# Patient Record
Sex: FEMALE | Race: BLACK OR AFRICAN AMERICAN | NOT HISPANIC OR LATINO
[De-identification: names, ages, dates, MRNs, and addresses within clinical notes are randomized per-mention and may not be internally consistent; named-entity substitution may affect disease eponyms.]

---

## 2017-09-11 ENCOUNTER — RESULT REVIEW (OUTPATIENT)
Age: 52
End: 2017-09-11

## 2017-11-09 ENCOUNTER — TRANSCRIPTION ENCOUNTER (OUTPATIENT)
Age: 52
End: 2017-11-09

## 2018-01-09 ENCOUNTER — APPOINTMENT (OUTPATIENT)
Dept: UROLOGY | Facility: CLINIC | Age: 53
End: 2018-01-09

## 2018-02-20 ENCOUNTER — LABORATORY RESULT (OUTPATIENT)
Age: 53
End: 2018-02-20

## 2018-02-20 ENCOUNTER — APPOINTMENT (OUTPATIENT)
Dept: UROLOGY | Facility: CLINIC | Age: 53
End: 2018-02-20
Payer: COMMERCIAL

## 2018-02-20 VITALS
SYSTOLIC BLOOD PRESSURE: 143 MMHG | OXYGEN SATURATION: 96 % | DIASTOLIC BLOOD PRESSURE: 91 MMHG | TEMPERATURE: 98.8 F | HEIGHT: 60 IN | WEIGHT: 120 LBS | BODY MASS INDEX: 23.56 KG/M2 | HEART RATE: 88 BPM

## 2018-02-20 DIAGNOSIS — F17.200 NICOTINE DEPENDENCE, UNSPECIFIED, UNCOMPLICATED: ICD-10-CM

## 2018-02-20 DIAGNOSIS — R31.29 OTHER MICROSCOPIC HEMATURIA: ICD-10-CM

## 2018-02-20 PROCEDURE — 99204 OFFICE O/P NEW MOD 45 MIN: CPT | Mod: 25

## 2018-02-20 PROCEDURE — 51798 US URINE CAPACITY MEASURE: CPT

## 2018-02-20 RX ORDER — SUMATRIPTAN 50 MG/1
50 TABLET, FILM COATED ORAL
Qty: 9 | Refills: 0 | Status: ACTIVE | COMMUNITY
Start: 2017-06-19

## 2018-02-20 RX ORDER — VALACYCLOVIR 500 MG/1
500 TABLET, FILM COATED ORAL
Qty: 30 | Refills: 0 | Status: ACTIVE | COMMUNITY
Start: 2017-08-29

## 2018-02-26 ENCOUNTER — RX RENEWAL (OUTPATIENT)
Age: 53
End: 2018-02-26

## 2018-02-26 LAB
APPEARANCE: CLEAR
BACTERIA UR CULT: NORMAL
BACTERIA: NEGATIVE
BILIRUBIN URINE: NEGATIVE
BLOOD URINE: NEGATIVE
COLOR: YELLOW
GLUCOSE QUALITATIVE U: NEGATIVE MG/DL
KETONES URINE: NEGATIVE
LEUKOCYTE ESTERASE URINE: NEGATIVE
MICROSCOPIC-UA: NORMAL
NITRITE URINE: NEGATIVE
PH URINE: 7.5
PROTEIN URINE: NEGATIVE MG/DL
RED BLOOD CELLS URINE: 2 /HPF
SPECIFIC GRAVITY URINE: 1.01
SQUAMOUS EPITHELIAL CELLS: 0 /HPF
UROBILINOGEN URINE: NEGATIVE MG/DL
WHITE BLOOD CELLS URINE: 0 /HPF

## 2018-04-26 ENCOUNTER — APPOINTMENT (OUTPATIENT)
Dept: UROLOGY | Facility: CLINIC | Age: 53
End: 2018-04-26
Payer: COMMERCIAL

## 2018-04-26 VITALS — TEMPERATURE: 99.1 F | HEART RATE: 76 BPM | SYSTOLIC BLOOD PRESSURE: 139 MMHG | DIASTOLIC BLOOD PRESSURE: 83 MMHG

## 2018-04-26 DIAGNOSIS — N32.81 OVERACTIVE BLADDER: ICD-10-CM

## 2018-04-26 PROCEDURE — 99213 OFFICE O/P EST LOW 20 MIN: CPT

## 2018-04-26 PROCEDURE — 51798 US URINE CAPACITY MEASURE: CPT

## 2019-03-25 ENCOUNTER — RX RENEWAL (OUTPATIENT)
Age: 54
End: 2019-03-25

## 2019-03-27 ENCOUNTER — TRANSCRIPTION ENCOUNTER (OUTPATIENT)
Age: 54
End: 2019-03-27

## 2019-04-30 ENCOUNTER — APPOINTMENT (OUTPATIENT)
Dept: UROLOGY | Facility: CLINIC | Age: 54
End: 2019-04-30
Payer: COMMERCIAL

## 2019-04-30 VITALS — TEMPERATURE: 98.9 F | HEART RATE: 64 BPM | SYSTOLIC BLOOD PRESSURE: 149 MMHG | DIASTOLIC BLOOD PRESSURE: 78 MMHG

## 2019-04-30 PROCEDURE — 99213 OFFICE O/P EST LOW 20 MIN: CPT

## 2019-04-30 NOTE — ASSESSMENT
[FreeTextEntry1] : OAB with occasional UUI\par continue oxbytynin\par no interest in botox/interstim\par f/u 1 year

## 2019-04-30 NOTE — HISTORY OF PRESENT ILLNESS
[FreeTextEntry1] : doing well on ditropan\par when off medication symptoms recur\par no nausea vomting. no hematuria\par

## 2020-06-23 ENCOUNTER — APPOINTMENT (OUTPATIENT)
Dept: UROLOGY | Facility: CLINIC | Age: 55
End: 2020-06-23

## 2020-08-20 ENCOUNTER — INPATIENT (INPATIENT)
Facility: HOSPITAL | Age: 55
LOS: 1 days | Discharge: HOME | DRG: 181 | End: 2020-08-22
Attending: HOSPITALIST | Admitting: STUDENT IN AN ORGANIZED HEALTH CARE EDUCATION/TRAINING PROGRAM
Payer: COMMERCIAL

## 2020-08-20 VITALS
SYSTOLIC BLOOD PRESSURE: 132 MMHG | DIASTOLIC BLOOD PRESSURE: 87 MMHG | OXYGEN SATURATION: 98 % | WEIGHT: 95.9 LBS | RESPIRATION RATE: 19 BRPM | HEART RATE: 121 BPM | TEMPERATURE: 99 F

## 2020-08-20 DIAGNOSIS — R63.8 OTHER SYMPTOMS AND SIGNS CONCERNING FOOD AND FLUID INTAKE: ICD-10-CM

## 2020-08-20 DIAGNOSIS — M54.6 PAIN IN THORACIC SPINE: ICD-10-CM

## 2020-08-20 DIAGNOSIS — G43.909 MIGRAINE, UNSPECIFIED, NOT INTRACTABLE, WITHOUT STATUS MIGRAINOSUS: ICD-10-CM

## 2020-08-20 DIAGNOSIS — C80.1 MALIGNANT (PRIMARY) NEOPLASM, UNSPECIFIED: ICD-10-CM

## 2020-08-20 DIAGNOSIS — G89.3 NEOPLASM RELATED PAIN (ACUTE) (CHRONIC): ICD-10-CM

## 2020-08-20 DIAGNOSIS — Z90.89 ACQUIRED ABSENCE OF OTHER ORGANS: Chronic | ICD-10-CM

## 2020-08-20 DIAGNOSIS — C34.92 MALIGNANT NEOPLASM OF UNSPECIFIED PART OF LEFT BRONCHUS OR LUNG: ICD-10-CM

## 2020-08-20 DIAGNOSIS — Z87.891 PERSONAL HISTORY OF NICOTINE DEPENDENCE: ICD-10-CM

## 2020-08-20 DIAGNOSIS — B00.9 HERPESVIRAL INFECTION, UNSPECIFIED: ICD-10-CM

## 2020-08-20 LAB
BLD GP AB SCN SERPL QL: NEGATIVE — SIGNIFICANT CHANGE UP
RH IG SCN BLD-IMP: POSITIVE — SIGNIFICANT CHANGE UP
SARS-COV-2 RNA SPEC QL NAA+PROBE: SIGNIFICANT CHANGE UP

## 2020-08-20 PROCEDURE — 71275 CT ANGIOGRAPHY CHEST: CPT | Mod: 26

## 2020-08-20 PROCEDURE — 93010 ELECTROCARDIOGRAM REPORT: CPT

## 2020-08-20 PROCEDURE — 99223 1ST HOSP IP/OBS HIGH 75: CPT

## 2020-08-20 PROCEDURE — 99285 EMERGENCY DEPT VISIT HI MDM: CPT

## 2020-08-20 PROCEDURE — 74177 CT ABD & PELVIS W/CONTRAST: CPT | Mod: 26

## 2020-08-20 RX ORDER — SODIUM CHLORIDE 9 MG/ML
1000 INJECTION INTRAMUSCULAR; INTRAVENOUS; SUBCUTANEOUS ONCE
Refills: 0 | Status: COMPLETED | OUTPATIENT
Start: 2020-08-20 | End: 2020-08-20

## 2020-08-20 RX ORDER — TIZANIDINE 4 MG/1
2 TABLET ORAL
Qty: 0 | Refills: 0 | DISCHARGE

## 2020-08-20 RX ORDER — SUMATRIPTAN SUCCINATE 4 MG/.5ML
50 INJECTION, SOLUTION SUBCUTANEOUS
Qty: 0 | Refills: 0 | DISCHARGE

## 2020-08-20 RX ORDER — IOHEXOL 300 MG/ML
30 INJECTION, SOLUTION INTRAVENOUS ONCE
Refills: 0 | Status: COMPLETED | OUTPATIENT
Start: 2020-08-20 | End: 2020-08-20

## 2020-08-20 RX ORDER — VALACYCLOVIR 500 MG/1
500 TABLET, FILM COATED ORAL DAILY
Refills: 0 | Status: DISCONTINUED | OUTPATIENT
Start: 2020-08-20 | End: 2020-08-22

## 2020-08-20 RX ORDER — VALACYCLOVIR 500 MG/1
0 TABLET, FILM COATED ORAL
Qty: 0 | Refills: 0 | DISCHARGE

## 2020-08-20 RX ORDER — VALACYCLOVIR 500 MG/1
500 TABLET, FILM COATED ORAL
Qty: 0 | Refills: 0 | DISCHARGE

## 2020-08-20 RX ORDER — SUMATRIPTAN SUCCINATE 4 MG/.5ML
0 INJECTION, SOLUTION SUBCUTANEOUS
Qty: 0 | Refills: 0 | DISCHARGE

## 2020-08-20 RX ORDER — SUMATRIPTAN SUCCINATE 4 MG/.5ML
50 INJECTION, SOLUTION SUBCUTANEOUS DAILY
Refills: 0 | Status: DISCONTINUED | OUTPATIENT
Start: 2020-08-20 | End: 2020-08-22

## 2020-08-20 RX ORDER — DIVALPROEX SODIUM 500 MG/1
1 TABLET, DELAYED RELEASE ORAL
Qty: 0 | Refills: 0 | DISCHARGE

## 2020-08-20 RX ORDER — ENOXAPARIN SODIUM 100 MG/ML
40 INJECTION SUBCUTANEOUS EVERY 24 HOURS
Refills: 0 | Status: DISCONTINUED | OUTPATIENT
Start: 2020-08-21 | End: 2020-08-22

## 2020-08-20 RX ORDER — DICLOFENAC SODIUM 75 MG/1
1 TABLET, DELAYED RELEASE ORAL
Qty: 0 | Refills: 0 | DISCHARGE

## 2020-08-20 RX ORDER — LIDOCAINE 4 G/100G
2 CREAM TOPICAL EVERY 24 HOURS
Refills: 0 | Status: DISCONTINUED | OUTPATIENT
Start: 2020-08-20 | End: 2020-08-22

## 2020-08-20 RX ORDER — ACETAMINOPHEN 500 MG
650 TABLET ORAL EVERY 6 HOURS
Refills: 0 | Status: DISCONTINUED | OUTPATIENT
Start: 2020-08-20 | End: 2020-08-22

## 2020-08-20 RX ORDER — DIVALPROEX SODIUM 500 MG/1
0 TABLET, DELAYED RELEASE ORAL
Qty: 0 | Refills: 0 | DISCHARGE

## 2020-08-20 RX ORDER — DIVALPROEX SODIUM 500 MG/1
500 TABLET, DELAYED RELEASE ORAL DAILY
Refills: 0 | Status: DISCONTINUED | OUTPATIENT
Start: 2020-08-20 | End: 2020-08-22

## 2020-08-20 RX ORDER — ONDANSETRON 8 MG/1
4 TABLET, FILM COATED ORAL ONCE
Refills: 0 | Status: COMPLETED | OUTPATIENT
Start: 2020-08-20 | End: 2020-08-21

## 2020-08-20 RX ADMIN — SODIUM CHLORIDE 1000 MILLILITER(S): 9 INJECTION INTRAMUSCULAR; INTRAVENOUS; SUBCUTANEOUS at 12:35

## 2020-08-20 RX ADMIN — SODIUM CHLORIDE 1000 MILLILITER(S): 9 INJECTION INTRAMUSCULAR; INTRAVENOUS; SUBCUTANEOUS at 13:36

## 2020-08-20 RX ADMIN — IOHEXOL 30 MILLILITER(S): 300 INJECTION, SOLUTION INTRAVENOUS at 12:35

## 2020-08-20 RX ADMIN — LIDOCAINE 2 PATCH: 4 CREAM TOPICAL at 19:04

## 2020-08-20 RX ADMIN — VALACYCLOVIR 500 MILLIGRAM(S): 500 TABLET, FILM COATED ORAL at 23:21

## 2020-08-20 RX ADMIN — DIVALPROEX SODIUM 500 MILLIGRAM(S): 500 TABLET, DELAYED RELEASE ORAL at 23:21

## 2020-08-20 NOTE — ED PROVIDER NOTE - CLINICAL SUMMARY MEDICAL DECISION MAKING FREE TEXT BOX
Pt sent by Dr Ruffin for poss pe seen on ct of neck done for supraclavicular lad; pt w concern for undiagnosed ca.  + sob x 1 wk, R upper back pain x ~ 1 mo (now resolved).  VS w tachycardia, sinus tach on ekg.  Plan labs, cta for pe but also ct abd/pelvis to eval for ? intra-abd mass or other ca related issue.

## 2020-08-20 NOTE — H&P ADULT - ATTENDING COMMENTS
54F recently quit smoker, w migraines, HSV infection p/w supraclavicular NEDRA accompanied by unintended wt loss and back pain, sent from outpatient for evaluation of possible PE - found to have multiple lung, liver, and chiquis lesions supicious for metastatic cancer of lung origin and normocytic anemia, admitted for further evaluation    Pt states she began feeling two nodules on Left side supraclavicularly and was sent by PMD to Dr. Ruffin. CT Cervical spine was obtained. sxs also accompanied by unintentional wt loss over last month, lower back pain, and dyspnea.  Sent to ED to evaluate for PE - CT negative for PE but numerous nodules found in lungs; - largest of which in YURY, liver, pleura and spine suspicious for metastatic disease. Pt currently endorses back pain in band like region above pelvis/sacral area. Denies any change in urge to defecate or urinate. Denies any paresthesias, weakness, issues w ambulation. No saddle anesthesia.  Exam: AA female in NAD on RA, RRR, no murmurs, no BLE edema or tenderness. CTAB, NABS, non-tender abdomen, Alert, oriented, CN II-XI grossly intact, sensation equal b/l, 5/5 strength throughout, nml heel/shin. Firm, non-mobile lymph nodes x2 palpated in L supraclavicular region.     #Metastatic disease - unknown primary. Likely lung of SMLC > NSCLC. CTA/PE Chest negative for PE  #Back pain 2/2 multiple bone metastases - pt states excedrin and salonpas has helped. Start NSAIDs w Lidocaine patch  #Normocytic anemia - denies any GI bleeding sxs. Possibly anemia of chronic disease in setting of malignancy  #Migraines - chronic. Controlled. c/w home agents - depakote, sumatriptan PRN  #HSV infection - chronic. c/w home valtrex  PPx: SQL    Plan  NPO; IR cx for biopsy  Iron panel; Ferritin  F/U Heme-Onc recommendations

## 2020-08-20 NOTE — ED ADULT NURSE NOTE - ED STAT RN HANDOFF DETAILS
Patient stable for transfer to ed holding. Transfer and plan of care discussed with patient and verbalized understanding. No acute distress noted. Report given to YI Morales. Safety precautions maintained. Belongings secured with patient.

## 2020-08-20 NOTE — H&P ADULT - HISTORY OF PRESENT ILLNESS
Patient is a 53 y/o F with Patient is a 53 y/o F with migraines, scaitica, and significant smoking history (about 5 cigarettes day for 38 years) who presents to ED after Dr. Ruffin saw possible PE and metastasis on a CT scan of neck. Patient states she has felt a nodule in her neck since last July. When she went to her PCP (Dr. Joel Townsend) her PCP was concerned for possible malignancy after Patient is a 55 y/o F with migraines, scaitica, and significant smoking history (about 5 cigarettes day for 38 years) who presents to ED after Dr. Ruffin saw possible PE and metastasis on a CT scan of neck. Patient states she has felt a nodule in her neck since last July. When she went to her PCP (Dr. Joel Townsend) her PCP was concerned for possible malignancy after examination. She sent Mrs. Zapien to see Dr. Ruffin, Oncologist, last week where he ordered a CT scan of the neck. The results of CT came back for possible PE and concern for metastasis, patient informed of results this morning and instructed to come to the ED. She endorses significant weight loss, 11 lbs in the past month along with mild shortness of breath. She states she noticed herself "catching her breath" more recently. She believed it to be due to wearing a mask. She also reports having severe back pain since July for which she took muscle relaxants for without relief. She reports improvement in back pain currently. She denies chest pain, abdominal pain, and N/V/D.     ED Vitals: T 98.9, , /87, RR 19, 98 O2 RA   ED Labs/ imaging: Hb 9.7, K 3.4, Alk phos 509   CT angio: neg PE, CT chest/abd/pelvis: Left lung mass, left supraclavicular and right hilar lymphadenopathy, diffuse osseous metastasis, suspected hepatic and pleural metastasis     PCP: Dr. Rosette Townsend, [phone]     Patient admitted for Patient is a 53 y/o F with migraines, scaitica, and significant smoking history (about 5 cigarettes day for 38 years) who presents to ED after Dr. Ruffin saw possible PE and metastasis on a CT scan of neck. Patient states she has felt a nodule in her neck since last July. When she went to her PCP (Dr. Joel Townsend) her PCP was concerned for possible malignancy after examination. She sent Mrs. Zapien to see Dr. Ruffin, Oncologist, last week where he ordered a CT scan of the neck. The results of CT came back for possible PE and concern for metastasis, patient informed of results this morning and instructed to come to the ED. She endorses significant weight loss, 11 lbs in the past month along with mild shortness of breath. She states she noticed herself "catching her breath" more recently. She believed it to be due to wearing a mask. She also reports having severe back pain since July for which she took muscle relaxants for without relief. She reports improvement in back pain currently. She denies chest pain, abdominal pain, and N/V/D.     ED Vitals: T 98.9, , /87, RR 19, 98 O2 RA   ED Labs/ imaging: Hb 9.7, K 3.4, Alk phos 509   CT angio: neg PE, CT chest/abd/pelvis: Left lung mass, left supraclavicular and right hilar lymphadenopathy, diffuse osseous metastasis, suspected hepatic and pleural metastasis     PCP: Dr. Rosette Townsend, [phone]     Patient admitted for newly diagnosed stage 4 lung malignancy with metatstasis seen on CT Patient is a 55 y/o F with migraines, sciatica and significant smoking history (about 5 cigarettes day for 38 years) who presents to ED after CT scan showed concern for PE and new metastatic lesions. Patient states she has felt a nodule in her neck since last July. When she went to her PCP (Dr. Joel Townsend) her PCP was concerned for possible malignancy after examination. She sent Mrs. Zapien to see Dr. Ruffin, Oncologist, last week where he ordered a CT scan of the neck. The results of CT came back for possible PE and concern for metastasis, patient informed of results this morning and instructed to come to the ED. She endorses significant weight loss, 11 lbs in the past month along with mild shortness of breath. She states she noticed herself "catching her breath" more recently. She believed it to be due to wearing a mask. She also reports having severe back pain since July for which she took muscle relaxants for without relief. She reports improvement in back pain currently. She denies chest pain, abdominal pain, and N/V/D.     ED Vitals: T 98.9, , /87, RR 19, 98 O2 RA   ED Labs/ imaging: Hb 9.7, K 3.4, Alk phos 509   CT angio: neg PE, CT chest/abd/pelvis: Left lung mass, left supraclavicular and right hilar lymphadenopathy, diffuse osseous metastasis, suspected hepatic and pleural metastasis     PCP: Dr. Rosette Townsend, [phone]     Patient admitted for newly diagnosed stage 4 lung malignancy with metastasis seen on CT.

## 2020-08-20 NOTE — H&P ADULT - PROBLEM SELECTOR PLAN 1
pt presenting with weight loss, smoking history, and back pain in the setting of newly found lung mass on CT. Likely small cell carcinoma given smoking hx and extent of metastasis   - CT c/a/p: Left lung mass consistent with malignancy, with left supraclavicular and right hilar mets, suspected hepatic and pleural mets, and diffuse osseus mets  PLAN  - Heme/onc consulted  - IR guided biopsy in AM   - f/u PET scan   - f/u MRI head pt presenting with weight loss, smoking history, and back pain in the setting of newly found lung mass on CT. Likely primary lung carcinoma,  small cell lung carcinoma given smoking hx and extent of metastasis   - CT c/a/p: Left lung mass consistent with malignancy, with left supraclavicular and right hilar mets, suspected hepatic and pleural mets, and diffuse osseus mets  PLAN  - Heme/onc consulted  - IR guided biopsy in AM   - f/u PET scan   - f/u MRI head, MRI cervical spine, MRI thoracic spine

## 2020-08-20 NOTE — H&P ADULT - NSHPSOCIALHISTORY_GEN_ALL_CORE
Current smoker, 5 cigarettes daily for 38 years   Alcohol use: socially   Denies recreational drug use

## 2020-08-20 NOTE — CONSULT NOTE ADULT - ASSESSMENT
55 yo F smoker was recently evaluated at Dr. Ruffin's office for painless cervical adenopathy, unintentional weight loss. Initial CT of the neck with evidence of metastatic disease and possible lung primary and PE. Sent to the ED for CTA evaluation and CT abdomen. CTA negative for PE.   Results form her scan in the ED:   1.  Left lung mass consistent with malignancy suspicious for primary which broadly contacts left major fissure and also pericardium.  2.  Left supraclavicular and right hilar adenopathy consistent with metastasis.  3.  Diffuse osseous metastases throughout the axial skeleton, sternum, and pelvis with multilevel compression deformities suspected pathologic bases as detailed above.  4.  Suspected hepatic and right pleural metastases.  5.  Punctate noncalcified subpleural nodule medial right middle lobe 0.2 cm, indeterminate, possibly metastasis    #Metastatic Cancer  - Likely lung primary with metastatic disease to bone, liver.   - ECOG PS 0-1  - Will need tissue biopsy  - Recommend obtaining PET scan  - Recommend IR guided biopsy of hepatic lesion  - Recommend MRIs of the brain, cervical, thoracic spine with and without IV contrast  - Recommend Pulmonary consult    D/w Dr. Ruffin

## 2020-08-20 NOTE — H&P ADULT - PROBLEM SELECTOR PLAN 5
F: none   E: replete as needed  N: NPO after midnight   DVT ppx: Lovenox (khorana score for VTE= 3, high risk)   CODE: FULL  Dispo: RM

## 2020-08-20 NOTE — H&P ADULT - PROBLEM SELECTOR PLAN 4
F: none   E: replete as needed  N: NPO after midnight   DVT ppx: Lovenox (khorana score for VTE= 3, high risk)   CODE: FULL  Dispo: RM pt with back pain, worsening recently. likely 2/2 osseus mets  - lidocaine patch, Tylenol prn  - f/u MRI thoracic spine

## 2020-08-20 NOTE — H&P ADULT - PROBLEM SELECTOR PROBLEM 1
Health Maintenance Due   Topic Date Due   • Colorectal Cancer Screening-Colonoscopy  11/12/2013   • DTaP/Tdap/Td Vaccine (2 - Td) 02/09/2019       Patient is due for topics listed above, she wishes to proceed with Immunization(s) Dtap/Tdap/Td, but is not proceeding with Colorectal Cancer Screening: Colonoscopy at this time.              Primary malignant neoplasm of left lung metastatic to other site Malignancy

## 2020-08-20 NOTE — ED ADULT NURSE NOTE - CHPI ED NUR SYMPTOMS NEG
no tingling/no fever/no pain/no vomiting/no weakness/no nausea/no chills/no dizziness/no decreased eating/drinking

## 2020-08-20 NOTE — H&P ADULT - NSHPPHYSICALEXAM_GEN_ALL_CORE
.  VITAL SIGNS:  T(C): 37.2 (08-20-20 @ 11:28), Max: 37.2 (08-20-20 @ 11:28)  T(F): 98.9 (08-20-20 @ 11:28), Max: 98.9 (08-20-20 @ 11:28)  HR: 101 (08-20-20 @ 14:52) (101 - 121)  BP: 142/84 (08-20-20 @ 14:52) (132/87 - 142/84)  BP(mean): --  RR: 20 (08-20-20 @ 14:52) (19 - 20)  SpO2: 98% (08-20-20 @ 14:52) (98% - 98%)  Wt(kg): --    PHYSICAL EXAM:    Constitutional: WDWN resting comfortably in bed; NAD  Head: NC/AT  Eyes: PERRL, EOMI, clear conjunctiva  ENT: no nasal discharge; uvula midline, no oropharyngeal erythema or exudates; MMM  Neck: supple; no JVD   Respiratory: CTA B/L; no W/R/R, no retractions  Cardiac: +S1/S2; RRR; no M/R/G; PMI non-displaced  Gastrointestinal: soft, NT/ND; no rebound or guarding; +BSx4  Back: spine midline, no bony tenderness  Extremities: WWP, no clubbing or cyanosis; no peripheral edema  Musculoskeletal: NROM x4; no joint swelling, tenderness or erythema  Vascular: 2+ radial, DP/PT pulses B/L  Dermatologic: skin warm, dry and intact; no rashes, wounds, or scars  Lymphatic: supraclavicular lymphadenopathy   Neurologic: AAOx3; CNII-XII grossly intact; no focal deficits  Psychiatric: affect and characteristics of appearance, verbalizations, behaviors are appropriate

## 2020-08-20 NOTE — ED ADULT TRIAGE NOTE - CHIEF COMPLAINT QUOTE
Patient went to see her PMD c/o a lump to the left side of her neck and shortness of breath, and was told that she has a clot in her lungs that showed up on the CT chest.

## 2020-08-20 NOTE — ED PROVIDER NOTE - PROGRESS NOTE DETAILS
Pt discussed w Dr Ruffin - pt eval for L cervical lad, h/o tob - ct w mets to cervical and thoracic spine, ? pe L lung.  He's requesting ct angio for pe but agrees w addition of ct abd/pelvis to eval for likely lung ca w mets.  Heme fellow coming to see pt in ED.  He states hgb 9.9 3 d ago, iron studies nl, suspects anemia of chronic disease, wbc/plt nl. Verbal report - pt w/o pe but + stage 4 lung ca w mets to liver, bone, carcinomatosis.  Heme fellow w pt.  Results discussed w Dr Ruffin - wants to admit pt for biopsy and treatment initiation due to strong concern for aggressive small cell lung ca.

## 2020-08-20 NOTE — ED PROVIDER NOTE - OBJECTIVE STATEMENT
53 yo female h/o migraines, hsv sent by Dr Ruffin for further eval.  Pt notes ~ 1 mo wt loss, night sweats, pain in R lateral mid back and midline low back (now resolved) and swelling L neck area.  Pt discussed w her pmd who did blood work and sent her for eval with Dr Ruffin.  He saw the pt 8/17 and sent her for ct neck which showed bony mets and ?able pe L lung so the pt was referred to the ed for eval.  Pt notes mild sob, worse w exertion x 1 wk.  No cough, uri sx, fever, cp, abd pain, n/v/d.  Pt reports nl mammogram last yr, nl colonoscopy 5 yr ago.  No black/bloody stools.  No other lad.  + fh lung ca (father), leukemia (brother).  + tob - quit ~ 1 wk ago.  Pt notes ~ 10 lb wt loss over the past month.  No travel, le edema/pain, numbness/weakness in ext, trauma to back, change in upper back pain w deep inspiration, sick contacts.  No h/o malignancy.

## 2020-08-20 NOTE — H&P ADULT - NSICDXFAMILYHX_GEN_ALL_CORE_FT
FAMILY HISTORY:  Family history of leukemia, Brother - in remission  Family hx of lung cancer, Father  FHx: throat cancer, Father

## 2020-08-20 NOTE — ED ADULT NURSE NOTE - OBJECTIVE STATEMENT
Received ambulatory sent in from MD Ruffin's office for possible PE as per patient. AOX4, speaking full sentences.  Patient denies chest pain.  Chest rise equal and symmetrical bilaterally. Resps even and nonlabored. Patient complaints of shortness of breath on exertion. Moves all extremities. No obvious trauma/injury/deformity noted. Patient oriented to ED area. All needs attended. POC reviewed. EKG done. Hourly rounding in progress.

## 2020-08-20 NOTE — H&P ADULT - ASSESSMENT
Patient is a 55 y/o F with migraines, sciatica and significant smoking history (about 5 cigarettes day for 38 years) who presents to ED after CT scan showed concern for PE and new metastatic lesions, admitted for newly diagnosed stage 4 lung carcinoma with metastasis seen on CT. Patient is a 55 y/o F with migraines, sciatica and significant smoking history (about 5 cigarettes day for 38 years) who presents to ED after CT scan showed concern for PE and new metastatic lesions, admitted for new malignancy with metastasis seen on CT, likely primary lung carcinoma.

## 2020-08-20 NOTE — ED PROVIDER NOTE - MUSCULOSKELETAL, MLM
Spine appears normal, neck/back nontender, range of motion is not limited, no muscle or joint tenderness

## 2020-08-20 NOTE — CONSULT NOTE ADULT - SUBJECTIVE AND OBJECTIVE BOX
Hematology Consult Note    HPI:  53 yo F was recently seen at Dr. Ruffin's office referred for evaluation of cervical adenopathy. States around the second week of July felt a lump in the left side of her neck, above the clavicle, described as painless. In addition she has lost weight recently, unintentionally. (>10lbs) Patient is a daily smoker but Denies ETOH abuse. Denies fevers, no night sweats, no dysphagia, no odynophagia. She denies any n/v, cough, shortness of breath, abdominal pain. Has had chronic musculoskeletal pain, sees a pain specialist. No headaches/dizziness.    Given these findings the patient had a CT of the neck performed yesterday  CT results as follows:   Findings consistent with metastatic disease involving the left frontal calvarium with intracranial extension as well as involving the cervical and thoracic spines;  Opacity within the left midlung zone on  images with right posterior pleural thickening and question of filling defects involving right pulmonary artery branches which can be seen in the setting of pulmonary emboli; Enlarged left thyroid lobe which demonstrates mediastinal extension with a nodule involving the posterior aspect of the left thyroid lobe.  Ovoid low density structure within the anterior right para midline tongue in the region in the medial aspect of the sublingual space, abutting the genioglossus muscle;    After the scan was read, patient was called today and advised to go the ED for a CTA to rule out Pulmonary embolism. CTA performed in the ED and without PE, however likely lung primary identified and liver and bony mets noted.     Patient will now be admitted for further workup, to complete staging, biopsy and possibly treatment.       Allergies:  No Known Allergies      MEDICATIONS  (PRN):  ondansetron Injectable 4 milliGRAM(s) IV Push once PRN Nausea and/or Vomiting      PAST MEDICAL & SURGICAL HISTORY:  HSV infection  Migraine  No significant past surgical history    FAMILY HISTORY:  Father: Lung Ca  Brother and Uncle: Leukemia     SOCIAL HISTORY: No EtOH, Smoker +    REVIEW OF SYSTEMS:    CONSTITUTIONAL: No weakness, fevers or chills  EYES/ENT: No visual changes;  No vertigo or throat pain   NECK: No pain or stiffness  RESPIRATORY: No cough, wheezing, hemoptysis; No shortness of breath  CARDIOVASCULAR: No chest pain or palpitations  GASTROINTESTINAL: No abdominal or epigastric pain. No nausea, vomiting, or hematemesis; No diarrhea or constipation. No melena or hematochezia.  GENITOURINARY: No dysuria, frequency or hematuria  NEUROLOGICAL: No numbness or weakness  SKIN: No itching, burning, rashes, or lesions   All other review of systems is negative unless indicated above.      Weight (kg): 43.5 (08-20 @ 11:28)    T(F): 98.9 (08-20-20 @ 11:28), Max: 98.9 (08-20-20 @ 11:28)  HR: 101 (08-20-20 @ 14:52)  BP: 142/84 (08-20-20 @ 14:52)  RR: 20 (08-20-20 @ 14:52)  SpO2: 98% (08-20-20 @ 14:52)  Wt(kg): --    GENERAL: NAD, thin  HEAD:  Atraumatic, Normocephalic  EYES: EOMI,  sclera clear  NECK: Supple, No JVD,  L supraclavicular LN +  CHEST/LUNG: Clear to auscultation bilaterally; No wheeze  HEART: Regular rate and rhythm; No murmurs, rubs, or gallops  ABDOMEN: Soft, Nontender, Nondistended; Bowel sounds present  EXTREMITIES:  2+ Peripheral Pulses, No clubbing, cyanosis, or edema  NEUROLOGY: non-focal  SKIN: No rashes or lesions                          9.7    8.83  )-----------( 360      ( 20 Aug 2020 11:49 )             32.3       08-20    141  |  101  |  7   ----------------------------<  125<H>  3.4<L>   |  26  |  0.69    Ca    10.1      20 Aug 2020 11:49    TPro  8.0  /  Alb  4.2  /  TBili  0.2  /  DBili  x   /  AST  20  /  ALT  5<L>  /  AlkPhos  509<H>  08-20

## 2020-08-20 NOTE — H&P ADULT - NSHPLABSRESULTS_GEN_ALL_CORE
.  LABS:                         9.7    8.83  )-----------( 360      ( 20 Aug 2020 11:49 )             32.3     08-20    141  |  101  |  7   ----------------------------<  125<H>  3.4<L>   |  26  |  0.69    Ca    10.1      20 Aug 2020 11:49    TPro  8.0  /  Alb  4.2  /  TBili  0.2  /  DBili  x   /  AST  20  /  ALT  5<L>  /  AlkPhos  509<H>  08-20    PT/INR - ( 20 Aug 2020 11:49 )   PT: 13.5 sec;   INR: 1.13          PTT - ( 20 Aug 2020 11:49 )  PTT:32.0 sec    CARDIAC MARKERS ( 20 Aug 2020 11:49 )  x     / <0.01 ng/mL / x     / x     / x          Serum Pro-Brain Natriuretic Peptide: 47 pg/mL (08-20 @ 11:49)        RADIOLOGY, EKG & ADDITIONAL TESTS: Reviewed.     CT Angio Chest PE Protocol w/ IV Cont: Negative for PE     1.  Left lung mass consistent with malignancy suspicious for primary which broadly contacts left major fissure and also pericardium.  2.  Left supraclavicular and right hilar adenopathy consistent with metastasis.  3.  Diffuse osseous metastases throughout the axial skeleton, sternum, and pelvis with multilevel compression deformities suspected pathologic bases as detailed above.  4.  Suspected hepatic and right pleural metastases.  5.  Punctate noncalcified subpleural nodule medial right middle lobe 0.2 cm, indeterminate, possibly metastasis.

## 2020-08-20 NOTE — H&P ADULT - PROBLEM SELECTOR PLAN 2
history of migraines, takes Depakote for prophylaxis and sumatriptan when needed   - also takes excedrin if needed   - continue Depakote and Sumatriptan history of migraines, takes Depakote for prophylaxis and sumatriptan when needed   - also takes Excedrin if needed   - continue Depakote and Sumatriptan

## 2020-08-21 ENCOUNTER — TRANSCRIPTION ENCOUNTER (OUTPATIENT)
Age: 55
End: 2020-08-21

## 2020-08-21 ENCOUNTER — RESULT REVIEW (OUTPATIENT)
Age: 55
End: 2020-08-21

## 2020-08-21 LAB
ALBUMIN SERPL ELPH-MCNC: 3.8 G/DL — SIGNIFICANT CHANGE UP (ref 3.3–5)
ALP SERPL-CCNC: 436 U/L — HIGH (ref 40–120)
ALT FLD-CCNC: 14 U/L — SIGNIFICANT CHANGE UP (ref 10–45)
ANION GAP SERPL CALC-SCNC: 15 MMOL/L — SIGNIFICANT CHANGE UP (ref 5–17)
APTT BLD: 28.2 SEC — SIGNIFICANT CHANGE UP (ref 27.5–35.5)
AST SERPL-CCNC: 38 U/L — SIGNIFICANT CHANGE UP (ref 10–40)
BASOPHILS # BLD AUTO: 0.07 K/UL — SIGNIFICANT CHANGE UP (ref 0–0.2)
BASOPHILS NFR BLD AUTO: 0.7 % — SIGNIFICANT CHANGE UP (ref 0–2)
BILIRUB SERPL-MCNC: 0.3 MG/DL — SIGNIFICANT CHANGE UP (ref 0.2–1.2)
BUN SERPL-MCNC: 7 MG/DL — SIGNIFICANT CHANGE UP (ref 7–23)
CALCIUM SERPL-MCNC: 9.8 MG/DL — SIGNIFICANT CHANGE UP (ref 8.4–10.5)
CHLORIDE SERPL-SCNC: 100 MMOL/L — SIGNIFICANT CHANGE UP (ref 96–108)
CO2 SERPL-SCNC: 24 MMOL/L — SIGNIFICANT CHANGE UP (ref 22–31)
CREAT SERPL-MCNC: 0.6 MG/DL — SIGNIFICANT CHANGE UP (ref 0.5–1.3)
EOSINOPHIL # BLD AUTO: 0.1 K/UL — SIGNIFICANT CHANGE UP (ref 0–0.5)
EOSINOPHIL NFR BLD AUTO: 0.9 % — SIGNIFICANT CHANGE UP (ref 0–6)
GLUCOSE BLDC GLUCOMTR-MCNC: 109 MG/DL — HIGH (ref 70–99)
GLUCOSE SERPL-MCNC: 139 MG/DL — HIGH (ref 70–99)
HCT VFR BLD CALC: 27.3 % — LOW (ref 34.5–45)
HCV AB S/CO SERPL IA: 0.04 S/CO — SIGNIFICANT CHANGE UP
HCV AB SERPL-IMP: SIGNIFICANT CHANGE UP
HGB BLD-MCNC: 8.2 G/DL — LOW (ref 11.5–15.5)
IMM GRANULOCYTES NFR BLD AUTO: 1.7 % — HIGH (ref 0–1.5)
INR BLD: 1.22 — HIGH (ref 0.88–1.16)
LYMPHOCYTES # BLD AUTO: 19.3 % — SIGNIFICANT CHANGE UP (ref 13–44)
LYMPHOCYTES # BLD AUTO: 2.05 K/UL — SIGNIFICANT CHANGE UP (ref 1–3.3)
MAGNESIUM SERPL-MCNC: 2.3 MG/DL — SIGNIFICANT CHANGE UP (ref 1.6–2.6)
MCHC RBC-ENTMCNC: 27.1 PG — SIGNIFICANT CHANGE UP (ref 27–34)
MCHC RBC-ENTMCNC: 30 GM/DL — LOW (ref 32–36)
MCV RBC AUTO: 90.1 FL — SIGNIFICANT CHANGE UP (ref 80–100)
MONOCYTES # BLD AUTO: 0.78 K/UL — SIGNIFICANT CHANGE UP (ref 0–0.9)
MONOCYTES NFR BLD AUTO: 7.4 % — SIGNIFICANT CHANGE UP (ref 2–14)
NEUTROPHILS # BLD AUTO: 7.43 K/UL — HIGH (ref 1.8–7.4)
NEUTROPHILS NFR BLD AUTO: 70 % — SIGNIFICANT CHANGE UP (ref 43–77)
NRBC # BLD: 0 /100 WBCS — SIGNIFICANT CHANGE UP (ref 0–0)
PHOSPHATE SERPL-MCNC: 3.8 MG/DL — SIGNIFICANT CHANGE UP (ref 2.5–4.5)
PLATELET # BLD AUTO: 305 K/UL — SIGNIFICANT CHANGE UP (ref 150–400)
POTASSIUM SERPL-MCNC: 3.8 MMOL/L — SIGNIFICANT CHANGE UP (ref 3.5–5.3)
POTASSIUM SERPL-SCNC: 3.8 MMOL/L — SIGNIFICANT CHANGE UP (ref 3.5–5.3)
PROT SERPL-MCNC: 7.1 G/DL — SIGNIFICANT CHANGE UP (ref 6–8.3)
PROTHROM AB SERPL-ACNC: 14.5 SEC — HIGH (ref 10.6–13.6)
RBC # BLD: 3.03 M/UL — LOW (ref 3.8–5.2)
RBC # FLD: 15.7 % — HIGH (ref 10.3–14.5)
SODIUM SERPL-SCNC: 139 MMOL/L — SIGNIFICANT CHANGE UP (ref 135–145)
WBC # BLD: 10.61 K/UL — HIGH (ref 3.8–10.5)
WBC # FLD AUTO: 10.61 K/UL — HIGH (ref 3.8–10.5)

## 2020-08-21 PROCEDURE — 88305 TISSUE EXAM BY PATHOLOGIST: CPT | Mod: 26

## 2020-08-21 PROCEDURE — 99233 SBSQ HOSP IP/OBS HIGH 50: CPT | Mod: GC

## 2020-08-21 PROCEDURE — 99152 MOD SED SAME PHYS/QHP 5/>YRS: CPT

## 2020-08-21 PROCEDURE — 99223 1ST HOSP IP/OBS HIGH 75: CPT | Mod: GC

## 2020-08-21 PROCEDURE — 76942 ECHO GUIDE FOR BIOPSY: CPT | Mod: 26

## 2020-08-21 PROCEDURE — 78815 PET IMAGE W/CT SKULL-THIGH: CPT | Mod: 26

## 2020-08-21 PROCEDURE — 47000 NEEDLE BIOPSY OF LIVER PERQ: CPT

## 2020-08-21 PROCEDURE — 88341 IMHCHEM/IMCYTCHM EA ADD ANTB: CPT | Mod: 26

## 2020-08-21 PROCEDURE — 88342 IMHCHEM/IMCYTCHM 1ST ANTB: CPT | Mod: 26

## 2020-08-21 PROCEDURE — 88173 CYTOPATH EVAL FNA REPORT: CPT | Mod: 26

## 2020-08-21 RX ORDER — METOCLOPRAMIDE HCL 10 MG
5 TABLET ORAL ONCE
Refills: 0 | Status: COMPLETED | OUTPATIENT
Start: 2020-08-21 | End: 2020-08-21

## 2020-08-21 RX ORDER — KETOROLAC TROMETHAMINE 30 MG/ML
15 SYRINGE (ML) INJECTION ONCE
Refills: 0 | Status: DISCONTINUED | OUTPATIENT
Start: 2020-08-21 | End: 2020-08-22

## 2020-08-21 RX ADMIN — DIVALPROEX SODIUM 500 MILLIGRAM(S): 500 TABLET, DELAYED RELEASE ORAL at 15:20

## 2020-08-21 RX ADMIN — LIDOCAINE 2 PATCH: 4 CREAM TOPICAL at 07:22

## 2020-08-21 RX ADMIN — LIDOCAINE 2 PATCH: 4 CREAM TOPICAL at 20:30

## 2020-08-21 RX ADMIN — ONDANSETRON 4 MILLIGRAM(S): 8 TABLET, FILM COATED ORAL at 15:20

## 2020-08-21 RX ADMIN — VALACYCLOVIR 500 MILLIGRAM(S): 500 TABLET, FILM COATED ORAL at 15:20

## 2020-08-21 RX ADMIN — LIDOCAINE 2 PATCH: 4 CREAM TOPICAL at 18:00

## 2020-08-21 RX ADMIN — Medication 5 MILLIGRAM(S): at 18:21

## 2020-08-21 NOTE — CONSULT NOTE ADULT - ATTENDING COMMENTS
Ms Zapien is a 53 yo F whom was referred to the emergency department after undergoing a CT of the Neck as an outpatient for cervical lymphadenopathy.  On CT imaging was a concern for a pulmonary vascular filling defect.  Patient was therefore recommended to go to the emergency room to rule out a PE    CT Neck showed significant metastasis and further imaging of CT Chest/Abdomen/Pelvis shows diffuse metastasis in the liver, osseous metastasis.  Patient with a lung mass.      Patient with back pain    Concern for primary lung cancer, given smoking history and diffuse metastasis possible small cell lung cancer    Recommend MRI of the Brain given CT neck showed calvarium lesion, rule out brain metastasis.      PET/CT scan    Recommend tissue biopsy, suspect liver biopsy maybe the most accessible    Would also recommend MRI imaging of the cervical/thoracic spine.  Has pain of the back but history of sciatica.  Denies any focal neurologic deficit, no bowel/urinary incontinence
Active smoker, family history of malignancy with left lung mass with mediastinal, supraclavicular lymphadenopathy with liver and bone metastasis. Agree with plan for liver biopsy for diagnosis for staging. No need for EBUS at present. If patient does not get enough tissue then supraclavicular lymph node resection should be considered for diagnosis or for tissue for molecular analysis for situation. Rest as above

## 2020-08-21 NOTE — CONSULT NOTE ADULT - SUBJECTIVE AND OBJECTIVE BOX
PULMONARY SERVICE INITIAL CONSULT NOTE  -------------------------------------------------------------    Chief Complaint/Reason for Consult:     HPI: 55 y/o F with migraines, sciatica and significant smoking history (about 5 cigarettes day for 38 years) who presents to ED after CT scan showed concern for PE and new metastatic lesions. Patient states she has felt a nodule in her neck since last July. When she went to her PCP (Dr. Joel Townsend) her PCP was concerned for possible malignancy after examination. She sent Mrs. Zapien to see Dr. Ruffin, Oncologist, last week where he ordered a CT scan of the neck. The results of CT came back for possible PE and concern for metastasis, patient informed of results this morning and instructed to come to the ED. She endorses significant weight loss, 11 lbs in the past month along with mild shortness of breath. She states she noticed herself "catching her breath" more recently. She believed it to be due to wearing a mask. She also reports having severe back pain since July for which she took muscle relaxants for without relief. She reports improvement in back pain currently.     Otherwise, pt denies fevers/chills, HA, dizziness, CP, cough, palpitations, abd pain, N/V, bowel changes, ext swelling     -------------------------------------------------------------  PAST MEDICAL & SURGICAL HISTORY:  HSV infection  Migraine  No pertinent past medical history  History of tonsillectomy  No significant past surgical history      FAMILY HISTORY:  Family history of leukemia: Brother - in remission  FHx: throat cancer: Father  Family hx of lung cancer: Father      SOCIAL HISTORY:  Smoking Status: [ ] Current, [ ] Former, [ ] Never  Pack Years:    MEDICATIONS:  Pulmonary:    Antimicrobials:  valACYclovir 500 milliGRAM(s) Oral daily    Anticoagulants:  enoxaparin Injectable 40 milliGRAM(s) SubCutaneous every 24 hours    Onc:    GI/:    Endocrine:    Cardiac:    Other Medications:  acetaminophen   Tablet .. 650 milliGRAM(s) Oral every 6 hours PRN  diVALproex  milliGRAM(s) Oral daily  lidocaine   Patch 2 Patch Transdermal every 24 hours  ondansetron Injectable 4 milliGRAM(s) IV Push once PRN  SUMAtriptan 50 milliGRAM(s) Oral daily PRN      Allergies    No Known Allergies    Intolerances        Vital Signs Last 24 Hrs  T(C): 37.6 (21 Aug 2020 06:02), Max: 38 (20 Aug 2020 19:43)  T(F): 99.7 (21 Aug 2020 06:02), Max: 100.4 (20 Aug 2020 19:43)  HR: 90 (21 Aug 2020 06:02) (89 - 121)  BP: 128/75 (21 Aug 2020 06:02) (128/75 - 144/78)  BP(mean): --  RR: 18 (21 Aug 2020 06:02) (17 - 20)  SpO2: 97% (21 Aug 2020 06:02) (97% - 99%)        -------------------------------------------------------------  PHYSICAL EXAM:    GEN: Comfortable, in NAD  HEENT: NC/AT, PEERLA, MMM  CARDIAC: RRR, Normal S1/S2, No MRGs  PULMONARY: CTA BL, no wheezing/rhonchi/rales - no accessory muscle use   ABDOMEN: Soft, NT/ND   EXT: No LE edema  NEURO: A&O x 3, CN II-XII grossly intact, moves all ext, sensation intact    -------------------------------------------------------------  LABS:        CBC Full  -  ( 20 Aug 2020 11:49 )  WBC Count : 8.83 K/uL  RBC Count : 3.52 M/uL  Hemoglobin : 9.7 g/dL  Hematocrit : 32.3 %  Platelet Count - Automated : 360 K/uL  Mean Cell Volume : 91.8 fl  Mean Cell Hemoglobin : 27.6 pg  Mean Cell Hemoglobin Concentration : 30.0 gm/dL  Auto Neutrophil # : 5.81 K/uL  Auto Lymphocyte # : 2.24 K/uL  Auto Monocyte # : 0.39 K/uL  Auto Eosinophil # : 0.15 K/uL  Auto Basophil # : 0.16 K/uL  Auto Neutrophil % : 64.9 %  Auto Lymphocyte % : 25.4 %  Auto Monocyte % : 4.4 %  Auto Eosinophil % : 1.7 %  Auto Basophil % : 1.8 %    08-20    141  |  101  |  7   ----------------------------<  125<H>  3.4<L>   |  26  |  0.69    Ca    10.1      20 Aug 2020 11:49    TPro  8.0  /  Alb  4.2  /  TBili  0.2  /  DBili  x   /  AST  20  /  ALT  5<L>  /  AlkPhos  509<H>  08-20    PT/INR - ( 20 Aug 2020 11:49 )   PT: 13.5 sec;   INR: 1.13          PTT - ( 20 Aug 2020 11:49 )  PTT:32.0 sec                  -------------------------------------------------------------  RADIOLOGY & ADDITIONAL STUDIES: ***in progress***    PULMONARY SERVICE INITIAL CONSULT NOTE  -------------------------------------------------------------    Chief Complaint/Reason for Consult:     HPI: 55 y/o F with migraines, sciatica and significant smoking history (about 5 cigarettes day for 38 years) who presents to ED after CT scan showed concern for PE and new metastatic lesions. Patient states she has felt a nodule in her neck since last July. When she went to her PCP (Dr. Joel Townsend) her PCP was concerned for possible malignancy after examination. She sent Mrs. Zapien to see Dr. Ruffin, Oncologist, last week where he ordered a CT scan of the neck. The results of CT came back for possible PE and concern for metastasis, patient informed of results this morning and instructed to come to the ED. She endorses significant weight loss, 11 lbs in the past month along with mild shortness of breath. She states she noticed herself "catching her breath" more recently. She believed it to be due to wearing a mask. She also reports having severe back pain since July for which she took muscle relaxants for without relief. She reports improvement in back pain currently. Otherwise, pt denies fevers/chills, HA, dizziness, CP, cough, palpitations, abd pain, N/V, bowel changes, ext swelling     On arrival to ED:  T 98.9, , /87, RR 19, 98 O2 RA   Notable ED Labs: Hb 9.7, K 3.4, Alk phos 509   Imaging: CT angio: neg PE, CT chest/abd/pelvis: Left lung mass, left supraclavicular and right hilar lymphadenopathy, diffuse osseous metastasis, suspected hepatic and pleural metastasis     ***in progress*** Patient in radiology ***in progress***      -------------------------------------------------------------  PAST MEDICAL & SURGICAL HISTORY:  HSV infection  Migraine  No pertinent past medical history  History of tonsillectomy  No significant past surgical history      FAMILY HISTORY:  Family history of leukemia: Brother - in remission  FHx: throat cancer: Father  Family hx of lung cancer: Father      SOCIAL HISTORY:  Smoking Status: [ ] Current, [ ] Former, [ ] Never  Pack Years:    MEDICATIONS:  Pulmonary:    Antimicrobials:  valACYclovir 500 milliGRAM(s) Oral daily    Anticoagulants:  enoxaparin Injectable 40 milliGRAM(s) SubCutaneous every 24 hours    Onc:    GI/:    Endocrine:    Cardiac:    Other Medications:  acetaminophen   Tablet .. 650 milliGRAM(s) Oral every 6 hours PRN  diVALproex  milliGRAM(s) Oral daily  lidocaine   Patch 2 Patch Transdermal every 24 hours  ondansetron Injectable 4 milliGRAM(s) IV Push once PRN  SUMAtriptan 50 milliGRAM(s) Oral daily PRN      Allergies    No Known Allergies    Intolerances        Vital Signs Last 24 Hrs  T(C): 37.6 (21 Aug 2020 06:02), Max: 38 (20 Aug 2020 19:43)  T(F): 99.7 (21 Aug 2020 06:02), Max: 100.4 (20 Aug 2020 19:43)  HR: 90 (21 Aug 2020 06:02) (89 - 121)  BP: 128/75 (21 Aug 2020 06:02) (128/75 - 144/78)  BP(mean): --  RR: 18 (21 Aug 2020 06:02) (17 - 20)  SpO2: 97% (21 Aug 2020 06:02) (97% - 99%)        -------------------------------------------------------------  PHYSICAL EXAM:    **patient unavailable, will update PE**    GEN: Comfortable, in NAD  HEENT: NC/AT, PEERLA, MMM  CARDIAC: RRR, Normal S1/S2, No MRGs  PULMONARY: CTA BL, no wheezing/rhonchi/rales - no accessory muscle use or retractions  ABDOMEN: Soft, NT/ND   EXT: No LE edema  NEURO: A&O x 3, CN II-XII grossly intact, moves all ext, sensation intact    -------------------------------------------------------------  LABS:        CBC Full  -  ( 20 Aug 2020 11:49 )  WBC Count : 8.83 K/uL  RBC Count : 3.52 M/uL  Hemoglobin : 9.7 g/dL  Hematocrit : 32.3 %  Platelet Count - Automated : 360 K/uL  Mean Cell Volume : 91.8 fl  Mean Cell Hemoglobin : 27.6 pg  Mean Cell Hemoglobin Concentration : 30.0 gm/dL  Auto Neutrophil # : 5.81 K/uL  Auto Lymphocyte # : 2.24 K/uL  Auto Monocyte # : 0.39 K/uL  Auto Eosinophil # : 0.15 K/uL  Auto Basophil # : 0.16 K/uL  Auto Neutrophil % : 64.9 %  Auto Lymphocyte % : 25.4 %  Auto Monocyte % : 4.4 %  Auto Eosinophil % : 1.7 %  Auto Basophil % : 1.8 %    08-20    141  |  101  |  7   ----------------------------<  125<H>  3.4<L>   |  26  |  0.69    Ca    10.1      20 Aug 2020 11:49    TPro  8.0  /  Alb  4.2  /  TBili  0.2  /  DBili  x   /  AST  20  /  ALT  5<L>  /  AlkPhos  509<H>  08-20    PT/INR - ( 20 Aug 2020 11:49 )   PT: 13.5 sec;   INR: 1.13          PTT - ( 20 Aug 2020 11:49 )  PTT:32.0 sec                  -------------------------------------------------------------  RADIOLOGY & ADDITIONAL STUDIES:    CT Angio Chest PE Protocol w/ IV Cont (08.20.20)  Impression:  1.  Left lung mass consistent with malignancy suspicious for primary which broadly contacts left major fissure and also pericardium.  2.  Left supraclavicular and right hilar adenopathy consistent with metastasis.  3.  Diffuse osseous metastases throughout the axial skeleton, sternum, and pelvis with multilevel compression deformities suspected pathologic bases as detailed above.  4.  Suspected hepatic and right pleural metastases.  5.  Punctate noncalcified subpleural nodule medial right middle lobe 0.2 cm, indeterminate, possibly metastasis.      CT Abdomen and Pelvis w/ Oral Cont and w/ IV Cont (08.20.20)_  Vessels:  No pulmonary embolism. No aortic aneurysm.  Impression:  1.  Left lung mass consistent with malignancy suspicious for primary which broadly contacts left major fissure and also pericardium.  2.  Left supraclavicular and right hilar adenopathy consistent with metastasis.  3.  Diffuse osseous metastases throughout the axial skeleton, sternum, and pelvis with multilevel compression deformities suspected pathologic bases as detailed above.  4.  Suspected hepatic and right pleural metastases.  5.  Punctate noncalcified subpleural nodule medial right middle lobe 0.2 cm, indeterminate, possibly metastasis. ***in progress***    PULMONARY SERVICE INITIAL CONSULT NOTE  -------------------------------------------------------------    Chief Complaint/Reason for Consult:     HPI: 55 y/o F with migraines, sciatica and significant smoking history (about 5 cigarettes day for 38 years) who presents to ED after CT scan showed concern for PE and new metastatic lesions. Patient states she has felt a nodule in her neck since last July. When she went to her PCP (Dr. Joel Townsend) her PCP was concerned for possible malignancy after examination. She sent Mrs. Zapien to see Dr. Ruffin, Oncologist, last week where he ordered a CT scan of the neck. The results of CT came back for possible PE and concern for metastasis, patient informed of results this morning and instructed to come to the ED. She endorses significant weight loss, 11 lbs in the past month along with mild shortness of breath. She states she noticed herself "catching her breath" more recently. She believed it to be due to wearing a mask. She also reports having severe back pain since July for which she took muscle relaxants for without relief. She reports improvement in back pain currently. Otherwise, pt denies fevers/chills, HA, dizziness, CP, cough, palpitations, abd pain, N/V, bowel changes, ext swelling     On arrival to ED:  T 98.9, , /87, RR 19, 98 O2 RA   Notable ED Labs: Hb 9.7, K 3.4, Alk phos 509   Imaging: CT angio: neg PE, CT chest/abd/pelvis: Left lung mass, left supraclavicular and right hilar lymphadenopathy, diffuse osseous metastasis, suspected hepatic and pleural metastasis     ***in progress*** Patient in radiology ***in progress***  -------------------------------------------------------------  PAST MEDICAL & SURGICAL HISTORY:  HSV infection  Migraine  No pertinent past medical history  History of tonsillectomy  No significant past surgical history      FAMILY HISTORY:  Family history of leukemia: Brother - in remission  FHx: throat cancer: Father  Family hx of lung cancer: Father      SOCIAL HISTORY:  Smoking Status: [ ] Current, [ ] Former, [ ] Never  Pack Years:    MEDICATIONS:  Pulmonary:    Antimicrobials:  valACYclovir 500 milliGRAM(s) Oral daily    Anticoagulants:  enoxaparin Injectable 40 milliGRAM(s) SubCutaneous every 24 hours    Onc:    GI/:    Endocrine:    Cardiac:    Other Medications:  acetaminophen   Tablet .. 650 milliGRAM(s) Oral every 6 hours PRN  diVALproex  milliGRAM(s) Oral daily  lidocaine   Patch 2 Patch Transdermal every 24 hours  ondansetron Injectable 4 milliGRAM(s) IV Push once PRN  SUMAtriptan 50 milliGRAM(s) Oral daily PRN      Allergies    No Known Allergies    Intolerances        Vital Signs Last 24 Hrs  T(C): 37.6 (21 Aug 2020 06:02), Max: 38 (20 Aug 2020 19:43)  T(F): 99.7 (21 Aug 2020 06:02), Max: 100.4 (20 Aug 2020 19:43)  HR: 90 (21 Aug 2020 06:02) (89 - 121)  BP: 128/75 (21 Aug 2020 06:02) (128/75 - 144/78)  BP(mean): --  RR: 18 (21 Aug 2020 06:02) (17 - 20)  SpO2: 97% (21 Aug 2020 06:02) (97% - 99%)        -------------------------------------------------------------  PHYSICAL EXAM:    **patient unavailable, will update PE**    GEN: Comfortable, in NAD  HEENT: NC/AT, PEERLA, MMM  CARDIAC: RRR, Normal S1/S2, No MRGs  PULMONARY: CTA BL, no wheezing/rhonchi/rales - no accessory muscle use or retractions  ABDOMEN: Soft, NT/ND   EXT: No LE edema  NEURO: A&O x 3, CN II-XII grossly intact, moves all ext, sensation intact    -------------------------------------------------------------  LABS:        CBC Full  -  ( 20 Aug 2020 11:49 )  WBC Count : 8.83 K/uL  RBC Count : 3.52 M/uL  Hemoglobin : 9.7 g/dL  Hematocrit : 32.3 %  Platelet Count - Automated : 360 K/uL  Mean Cell Volume : 91.8 fl  Mean Cell Hemoglobin : 27.6 pg  Mean Cell Hemoglobin Concentration : 30.0 gm/dL  Auto Neutrophil # : 5.81 K/uL  Auto Lymphocyte # : 2.24 K/uL  Auto Monocyte # : 0.39 K/uL  Auto Eosinophil # : 0.15 K/uL  Auto Basophil # : 0.16 K/uL  Auto Neutrophil % : 64.9 %  Auto Lymphocyte % : 25.4 %  Auto Monocyte % : 4.4 %  Auto Eosinophil % : 1.7 %  Auto Basophil % : 1.8 %    08-20    141  |  101  |  7   ----------------------------<  125<H>  3.4<L>   |  26  |  0.69    Ca    10.1      20 Aug 2020 11:49    TPro  8.0  /  Alb  4.2  /  TBili  0.2  /  DBili  x   /  AST  20  /  ALT  5<L>  /  AlkPhos  509<H>  08-20    PT/INR - ( 20 Aug 2020 11:49 )   PT: 13.5 sec;   INR: 1.13          PTT - ( 20 Aug 2020 11:49 )  PTT:32.0 sec                  -------------------------------------------------------------  RADIOLOGY & ADDITIONAL STUDIES:    CT Angio Chest PE Protocol w/ IV Cont (08.20.20)  Impression:  1.  Left lung mass consistent with malignancy suspicious for primary which broadly contacts left major fissure and also pericardium.  2.  Left supraclavicular and right hilar adenopathy consistent with metastasis.  3.  Diffuse osseous metastases throughout the axial skeleton, sternum, and pelvis with multilevel compression deformities suspected pathologic bases as detailed above.  4.  Suspected hepatic and right pleural metastases.  5.  Punctate noncalcified subpleural nodule medial right middle lobe 0.2 cm, indeterminate, possibly metastasis.      CT Abdomen and Pelvis w/ Oral Cont and w/ IV Cont (08.20.20)_  Vessels:  No pulmonary embolism. No aortic aneurysm.  Impression:  1.  Left lung mass consistent with malignancy suspicious for primary which broadly contacts left major fissure and also pericardium.  2.  Left supraclavicular and right hilar adenopathy consistent with metastasis.  3.  Diffuse osseous metastases throughout the axial skeleton, sternum, and pelvis with multilevel compression deformities suspected pathologic bases as detailed above.  4.  Suspected hepatic and right pleural metastases.  5.  Punctate noncalcified subpleural nodule medial right middle lobe 0.2 cm, indeterminate, possibly metastasis. PULMONARY SERVICE INITIAL CONSULT NOTE  -------------------------------------------------------------    Chief Complaint/Reason for Consult:     HPI: 53 y/o F with migraines, sciatica and significant smoking history (about 5 cigarettes day for 38 years) who presents to ED after CT scan showed concern for PE and new metastatic lesions. Patient states she has felt a nodule in her neck since last July. When she went to her PCP (Dr. Joel Townsend) her PCP was concerned for possible malignancy after examination. She sent Mrs. Zapien to see Dr. Ruffin, Oncologist, last week where he ordered a CT scan of the neck. The results of CT came back for possible PE and concern for metastasis, patient informed of results this morning and instructed to come to the ED. She endorses significant weight loss, 11 lbs in the past month along with mild shortness of breath. She states she noticed herself "catching her breath" more recently. She believed it to be due to wearing a mask. She also reports having severe back pain since July for which she took muscle relaxants for without relief. She reports improvement in back pain currently. Otherwise, pt denies fevers/chills, HA, dizziness, CP, cough, palpitations, abd pain, N/V, bowel changes, ext swelling     On arrival to ED:  T 98.9, , /87, RR 19, 98 O2 RA   Notable ED Labs: Hb 9.7, K 3.4, Alk phos 509   Imaging: CT angio: neg PE, CT chest/abd/pelvis: Left lung mass, left supraclavicular and right hilar lymphadenopathy, diffuse osseous metastasis, suspected hepatic and pleural metastasis     -------------------------------------------------------------  PAST MEDICAL & SURGICAL HISTORY:  HSV infection  Migraine  No pertinent past medical history  History of tonsillectomy  No significant past surgical history      FAMILY HISTORY:  Family history of leukemia: Brother - in remission  FHx: throat cancer: Father  Family hx of lung cancer: Father      SOCIAL HISTORY:  Smoking Status: [ ] Current, [ ] Former, [ ] Never  Pack Years:    MEDICATIONS:  Pulmonary:    Antimicrobials:  valACYclovir 500 milliGRAM(s) Oral daily    Anticoagulants:  enoxaparin Injectable 40 milliGRAM(s) SubCutaneous every 24 hours    Onc:    GI/:    Endocrine:    Cardiac:    Other Medications:  acetaminophen   Tablet .. 650 milliGRAM(s) Oral every 6 hours PRN  diVALproex  milliGRAM(s) Oral daily  lidocaine   Patch 2 Patch Transdermal every 24 hours  ondansetron Injectable 4 milliGRAM(s) IV Push once PRN  SUMAtriptan 50 milliGRAM(s) Oral daily PRN      Allergies    No Known Allergies    Intolerances        Vital Signs Last 24 Hrs  T(C): 37.6 (21 Aug 2020 06:02), Max: 38 (20 Aug 2020 19:43)  T(F): 99.7 (21 Aug 2020 06:02), Max: 100.4 (20 Aug 2020 19:43)  HR: 90 (21 Aug 2020 06:02) (89 - 121)  BP: 128/75 (21 Aug 2020 06:02) (128/75 - 144/78)  BP(mean): --  RR: 18 (21 Aug 2020 06:02) (17 - 20)  SpO2: 97% (21 Aug 2020 06:02) (97% - 99%)        -------------------------------------------------------------  PHYSICAL EXAM:  Patient taken to radiology, unavailable for physical examination.    -------------------------------------------------------------  LABS:        CBC Full  -  ( 20 Aug 2020 11:49 )  WBC Count : 8.83 K/uL  RBC Count : 3.52 M/uL  Hemoglobin : 9.7 g/dL  Hematocrit : 32.3 %  Platelet Count - Automated : 360 K/uL  Mean Cell Volume : 91.8 fl  Mean Cell Hemoglobin : 27.6 pg  Mean Cell Hemoglobin Concentration : 30.0 gm/dL  Auto Neutrophil # : 5.81 K/uL  Auto Lymphocyte # : 2.24 K/uL  Auto Monocyte # : 0.39 K/uL  Auto Eosinophil # : 0.15 K/uL  Auto Basophil # : 0.16 K/uL  Auto Neutrophil % : 64.9 %  Auto Lymphocyte % : 25.4 %  Auto Monocyte % : 4.4 %  Auto Eosinophil % : 1.7 %  Auto Basophil % : 1.8 %    08-20    141  |  101  |  7   ----------------------------<  125<H>  3.4<L>   |  26  |  0.69    Ca    10.1      20 Aug 2020 11:49    TPro  8.0  /  Alb  4.2  /  TBili  0.2  /  DBili  x   /  AST  20  /  ALT  5<L>  /  AlkPhos  509<H>  08-20    PT/INR - ( 20 Aug 2020 11:49 )   PT: 13.5 sec;   INR: 1.13          PTT - ( 20 Aug 2020 11:49 )  PTT:32.0 sec                  -------------------------------------------------------------  RADIOLOGY & ADDITIONAL STUDIES:    CT Angio Chest PE Protocol w/ IV Cont (08.20.20)  Impression:  1.  Left lung mass consistent with malignancy suspicious for primary which broadly contacts left major fissure and also pericardium.  2.  Left supraclavicular and right hilar adenopathy consistent with metastasis.  3.  Diffuse osseous metastases throughout the axial skeleton, sternum, and pelvis with multilevel compression deformities suspected pathologic bases as detailed above.  4.  Suspected hepatic and right pleural metastases.  5.  Punctate noncalcified subpleural nodule medial right middle lobe 0.2 cm, indeterminate, possibly metastasis.      CT Abdomen and Pelvis w/ Oral Cont and w/ IV Cont (08.20.20)_  Vessels:  No pulmonary embolism. No aortic aneurysm.  Impression:  1.  Left lung mass consistent with malignancy suspicious for primary which broadly contacts left major fissure and also pericardium.  2.  Left supraclavicular and right hilar adenopathy consistent with metastasis.  3.  Diffuse osseous metastases throughout the axial skeleton, sternum, and pelvis with multilevel compression deformities suspected pathologic bases as detailed above.  4.  Suspected hepatic and right pleural metastases.  5.  Punctate noncalcified subpleural nodule medial right middle lobe 0.2 cm, indeterminate, possibly metastasis. PULMONARY SERVICE INITIAL CONSULT NOTE  -------------------------------------------------------------    Chief Complaint/Reason for Consult:     HPI: 53 y/o F with migraines, sciatica and significant smoking history (about 5 cigarettes day for 38 years) who presents to ED after CT scan showed concern for PE and new metastatic lesions. Patient states she has felt a nodule in her neck since July. When she went to her PCP (Dr. Joel Townsend) her PCP was concerned for possible malignancy after examination. She sent Mrs. Zapien to see Dr. Ruffin, Oncologist, last week where he ordered a CT scan of the neck. The results of CT came back for possible PE and concern for malignancy, patient informed of results this morning and instructed to come to the ED for further imaging and workup. She reports significant weight loss, 11 lbs in the past month along with mild shortness of breath, mostly with exertion such as housework. She also reports having severe back pain since July for which she took muscle relaxants for without relief. She reports improvement in back pain currently. Otherwise, pt denies fevers/chills, HA, dizziness, CP, cough, palpitations, abd pain, N/V, bowel changes, ext swelling     On arrival to ED:  T 98.9, , /87, RR 19, 98 O2 RA   Notable ED Labs: Hb 9.7, K 3.4, Alk phos 509   Imaging: CT angio: neg PE, CT chest/abd/pelvis: Left lung mass, left supraclavicular and right hilar lymphadenopathy, diffuse osseous metastasis, suspected hepatic and pleural metastasis     -------------------------------------------------------------  PAST MEDICAL & SURGICAL HISTORY:  HSV infection  Migraine  No pertinent past medical history  History of tonsillectomy  No significant past surgical history      FAMILY HISTORY:  Family history of leukemia: Brother - in remission  FHx: throat cancer: Father  Family hx of lung cancer: Father      SOCIAL HISTORY:  Smoking Status: [ ] Current, [X] Former, [ ] Never  Pack Years: 5 cigarettes for 38 years, last smoked ~1 month ago    MEDICATIONS:  Pulmonary:    Antimicrobials:  valACYclovir 500 milliGRAM(s) Oral daily    Anticoagulants:  enoxaparin Injectable 40 milliGRAM(s) SubCutaneous every 24 hours    Onc:    GI/:    Endocrine:    Cardiac:    Other Medications:  acetaminophen   Tablet .. 650 milliGRAM(s) Oral every 6 hours PRN  diVALproex  milliGRAM(s) Oral daily  lidocaine   Patch 2 Patch Transdermal every 24 hours  ondansetron Injectable 4 milliGRAM(s) IV Push once PRN  SUMAtriptan 50 milliGRAM(s) Oral daily PRN      Allergies    No Known Allergies    Intolerances        Vital Signs Last 24 Hrs  T(C): 37.6 (21 Aug 2020 06:02), Max: 38 (20 Aug 2020 19:43)  T(F): 99.7 (21 Aug 2020 06:02), Max: 100.4 (20 Aug 2020 19:43)  HR: 90 (21 Aug 2020 06:02) (89 - 121)  BP: 128/75 (21 Aug 2020 06:02) (128/75 - 144/78)  BP(mean): --  RR: 18 (21 Aug 2020 06:02) (17 - 20)  SpO2: 97% (21 Aug 2020 06:02) (97% - 99%)        -------------------------------------------------------------  PHYSICAL EXAM:      Constitutional: WDWN resting comfortably in bed; NAD  	Head: NC/AT  	Eyes: PERRL  	Respiratory: CTA B/L; no W/R/R, no retractions  	Cardiac: +S1/S2; RRR; no M/R/G; PMI non-displaced  	Gastrointestinal: soft, NT/ND; no rebound or guarding; +BSx4  	Back: spine midline, bony tenderness L iliac crest w/ palpation  	Extremities: WWP, no clubbing or cyanosis; no peripheral edema  	Musculoskeletal: NROM x4; no joint swelling, tenderness or erythema  	Vascular: 2+ radial, DP/PT pulses B/L  	Dermatologic: skin warm, dry and intact; no rashes, wounds, or scars  	Lymphatic: supraclavicular lymphadenopathy   	Neurologic: AAOx3; no focal deficits  Psychiatric: affect and characteristics of appearance, verbalizations, behaviors are appropriate    -------------------------------------------------------------  LABS:        CBC Full  -  ( 20 Aug 2020 11:49 )  WBC Count : 8.83 K/uL  RBC Count : 3.52 M/uL  Hemoglobin : 9.7 g/dL  Hematocrit : 32.3 %  Platelet Count - Automated : 360 K/uL  Mean Cell Volume : 91.8 fl  Mean Cell Hemoglobin : 27.6 pg  Mean Cell Hemoglobin Concentration : 30.0 gm/dL  Auto Neutrophil # : 5.81 K/uL  Auto Lymphocyte # : 2.24 K/uL  Auto Monocyte # : 0.39 K/uL  Auto Eosinophil # : 0.15 K/uL  Auto Basophil # : 0.16 K/uL  Auto Neutrophil % : 64.9 %  Auto Lymphocyte % : 25.4 %  Auto Monocyte % : 4.4 %  Auto Eosinophil % : 1.7 %  Auto Basophil % : 1.8 %    08-20    141  |  101  |  7   ----------------------------<  125<H>  3.4<L>   |  26  |  0.69    Ca    10.1      20 Aug 2020 11:49    TPro  8.0  /  Alb  4.2  /  TBili  0.2  /  DBili  x   /  AST  20  /  ALT  5<L>  /  AlkPhos  509<H>  08-20    PT/INR - ( 20 Aug 2020 11:49 )   PT: 13.5 sec;   INR: 1.13          PTT - ( 20 Aug 2020 11:49 )  PTT:32.0 sec                  -------------------------------------------------------------  RADIOLOGY & ADDITIONAL STUDIES:    CT Angio Chest PE Protocol w/ IV Cont (08.20.20)  Impression:  1.  Left lung mass consistent with malignancy suspicious for primary which broadly contacts left major fissure and also pericardium.  2.  Left supraclavicular and right hilar adenopathy consistent with metastasis.  3.  Diffuse osseous metastases throughout the axial skeleton, sternum, and pelvis with multilevel compression deformities suspected pathologic bases as detailed above.  4.  Suspected hepatic and right pleural metastases.  5.  Punctate noncalcified subpleural nodule medial right middle lobe 0.2 cm, indeterminate, possibly metastasis.      CT Abdomen and Pelvis w/ Oral Cont and w/ IV Cont (08.20.20)_  Vessels:  No pulmonary embolism. No aortic aneurysm.  Impression:  1.  Left lung mass consistent with malignancy suspicious for primary which broadly contacts left major fissure and also pericardium.  2.  Left supraclavicular and right hilar adenopathy consistent with metastasis.  3.  Diffuse osseous metastases throughout the axial skeleton, sternum, and pelvis with multilevel compression deformities suspected pathologic bases as detailed above.  4.  Suspected hepatic and right pleural metastases.  5.  Punctate noncalcified subpleural nodule medial right middle lobe 0.2 cm, indeterminate, possibly metastasis.

## 2020-08-21 NOTE — PROGRESS NOTE ADULT - PROBLEM SELECTOR PLAN 4
pt with back pain, worsening recently. likely 2/2 osseus mets  - lidocaine patch, tylenol  - toradol   - f/u MRI thoracic spine pt with back pain, worsening recently. likely 2/2 osseus mets  - lidocaine patch, tylenol 650 mg prn  - toradol 15 mg IV Push. will reassess later.   - f/u MRI thoracic spine

## 2020-08-21 NOTE — DISCHARGE NOTE PROVIDER - CARE PROVIDER_API CALL
Fabiola Ruffin  HEMATOLOGY/ONCOLOGY  [address] [place] [address]  Phone: [phone]  Fax: [phone]  Scheduled Appointment: 08/25/2020 02:00 PM

## 2020-08-21 NOTE — CONSULT NOTE ADULT - ASSESSMENT
Patient is a 53 y/o F with migraines, sciatica and significant smoking history (about 5 cigarettes day for 38 years) who presents to ED after CT scan showed concern for PE and new metastatic lesions, admitted for new malignancy with metastasis seen on CT, likely primary lung carcinoma. Patient is a 55 y/o F with migraines, sciatica and significant smoking history (about 5 cigarettes day for 38 years) who presents to ED after CT scan showed concern for PE and new metastatic lesions, admitted for new malignancy with metastasis seen on CT, likely primary lung carcinoma. Patient admitted to [facility] for further staging, biopsy, and possible treatment.

## 2020-08-21 NOTE — CONSULT NOTE ADULT - PROBLEM SELECTOR RECOMMENDATION 9
Patient p/w weight loss, smoking history, back pain in setting of newly found lung mass on CT. CT c/a/p: Left lung mass consistent with malignancy, with left supraclavicular and right hilar mets, suspected hepatic and pleural mets, and diffuse osseus mets. Likely primary lung carcinoma given history and imaging findings.  -f/u IR guided biopsy Patient p/w weight loss, smoking history, back pain in setting of newly found lung mass on CT. CT c/a/p: Left lung mass consistent with malignancy, with left supraclavicular and right hilar mets, suspected hepatic and pleural mets, and diffuse osseus mets. Likely primary lung carcinoma given history and imaging findings.  -No acute intervention at this time from pulm standpoint. Awaiting biopsy results for furthest lesions to provide staging. If either bone or liver biopsies positive for primary lung malignancy, patient would be considered stage 4  -F/u IR guided biopsy for distant liver metastases  -Consider bone biopsy if liver metastases negative for lung malignancy   -Will continue to follow Patient p/w weight loss, smoking history, back pain in setting of newly found lung mass on CT. CT c/a/p: Left lung mass consistent with malignancy, with left supraclavicular and right hilar mets, suspected hepatic and pleural mets, and diffuse osseus mets. Likely primary lung carcinoma given history and imaging findings.  -No acute intervention at this time from pulm standpoint. Awaiting biopsy results for furthest lesions to provide staging.   -F/u IR guided biopsy for distant liver metastases  -Consider bone biopsy if liver metastases negative for lung malignancy   -Will continue to follow  -Outpatient f/u w/ Dr. Xavier Cole, likely monitor w/ serial CXR endobronchial obstruction Patient p/w weight loss, smoking history, back pain in setting of newly found lung mass on CT. CT c/a/p: Left lung mass consistent with malignancy, with left supraclavicular and right hilar mets, suspected hepatic and pleural mets, and diffuse osseus mets. Likely primary lung carcinoma given history and imaging findings.  -No acute intervention at this time from pulm standpoint. Awaiting biopsy results for furthest lesions to provide staging.   -F/u IR guided biopsy for distant liver metastases  -Consider bone biopsy if liver metastases negative for lung malignancy   -Will continue to follow  -Outpatient f/u w/ Dr. 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 Little to contribute from pulm standpoint at this point. Please schedule outpatient with Dr Cole [phone] in 2 weeks. She will need outpatient PFTs and repeat chest xray to moniter for pleural effusions or lung collapse.    The patient was s/e/d with Dr Cole. Patient p/w weight loss, smoking history, back pain in setting of newly found lung mass on CT. CT c/a/p: Left lung mass consistent with malignancy, with left supraclavicular and right hilar mets, suspected hepatic and pleural mets, and diffuse osseus mets. Likely primary lung carcinoma given history and imaging findings.  -No acute intervention at this time from pulm standpoint. Awaiting biopsy results for furthest lesions to provide staging.   -F/u IR guided biopsy for distant liver metastases  -Consider bone biopsy if liver metastases negative for lung malignancy   -Will continue to follow  -Outpatient f/u w/ Dr. 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 Little to contribute from pulm standpoint at this point. Please schedule outpatient with Dr Cole [phone] in 2 weeks. She will need outpatient PFTs and repeat chest xray to moniter for pleural effusions or lung collapse. OK to d/c from pulmonary standpoint.     The patient was s/e/d with Dr Cole.

## 2020-08-21 NOTE — PROGRESS NOTE ADULT - SUBJECTIVE AND OBJECTIVE BOX
OVERNIGHT EVENTS: none    SUBJECTIVE / INTERVAL HPI: Patient seen and examined at bedside.     VITAL SIGNS:  Vital Signs Last 24 Hrs  T(C): 37.6 (21 Aug 2020 06:02), Max: 38 (20 Aug 2020 19:43)  T(F): 99.7 (21 Aug 2020 06:02), Max: 100.4 (20 Aug 2020 19:43)  HR: 90 (21 Aug 2020 06:02) (89 - 104)  BP: 128/75 (21 Aug 2020 06:02) (128/75 - 144/78)  BP(mean): --  RR: 18 (21 Aug 2020 06:02) (17 - 18)  SpO2: 97% (21 Aug 2020 06:02) (97% - 99%)    PHYSICAL EXAM:    General: WDWN  HEENT: NCAT; PERRL, anicteric sclera; MMM  Neck: supple, trachea midline  Cardiovascular: S1, S2 normal; RRR, no M/G/R  Respiratory: CTABL; no W/R/R  Gastrointestinal: soft, nontender, nondistended. bowel sounds present.  Skin: no ulcerations or visible rashes appreciated  Extremities: WWP; no edema, clubbing or cyanosis  Vascular: 2+ radial, DP/PT pulses B/L  Neurological: AAOx3; CN II-XII grossly intact; no focal deficits    MEDICATIONS:  MEDICATIONS  (STANDING):  diVALproex  milliGRAM(s) Oral daily  enoxaparin Injectable 40 milliGRAM(s) SubCutaneous every 24 hours  lidocaine   Patch 2 Patch Transdermal every 24 hours  valACYclovir 500 milliGRAM(s) Oral daily    MEDICATIONS  (PRN):  acetaminophen   Tablet .. 650 milliGRAM(s) Oral every 6 hours PRN Moderate Pain (4 - 6)  SUMAtriptan 50 milliGRAM(s) Oral daily PRN Migraine      ALLERGIES:  Allergies    No Known Allergies    Intolerances        LABS:                        9.7    8.83  )-----------( 360      ( 20 Aug 2020 11:49 )             32.3     08-20    141  |  101  |  7   ----------------------------<  125<H>  3.4<L>   |  26  |  0.69    Ca    10.1      20 Aug 2020 11:49    TPro  8.0  /  Alb  4.2  /  TBili  0.2  /  DBili  x   /  AST  20  /  ALT  5<L>  /  AlkPhos  509<H>  08-20    PT/INR - ( 20 Aug 2020 11:49 )   PT: 13.5 sec;   INR: 1.13          PTT - ( 20 Aug 2020 11:49 )  PTT:32.0 sec    CAPILLARY BLOOD GLUCOSE      POCT Blood Glucose.: 109 mg/dL (21 Aug 2020 08:31)      RADIOLOGY & ADDITIONAL TESTS: Reviewed. OVERNIGHT EVENTS: none    SUBJECTIVE / INTERVAL HPI: Patient seen and examined at bedside.  Feels ok overall.  Appetite has been different since beginning of June and not so hungry today.  Endorses approximately 20 lb weight loss since June.  Has had lumps in her neck since june.  Denies headache.  Sometimes feels winded but has no sob at this time. denies cp.  endorses low back pain that has been present since june but not getting worse.     VITAL SIGNS:  Vital Signs Last 24 Hrs  T(C): 37.6 (21 Aug 2020 06:02), Max: 38 (20 Aug 2020 19:43)  T(F): 99.7 (21 Aug 2020 06:02), Max: 100.4 (20 Aug 2020 19:43)  HR: 90 (21 Aug 2020 06:02) (89 - 104)  BP: 128/75 (21 Aug 2020 06:02) (128/75 - 144/78)  BP(mean): --  RR: 18 (21 Aug 2020 06:02) (17 - 18)  SpO2: 97% (21 Aug 2020 06:02) (97% - 99%)    PHYSICAL EXAM:    General: WDWN, walking around  HEENT: NCAT; PERRL, anicteric sclera; MMM  Neck: supple, trachea midline  Cardiovascular: S1, S2 normal; RRR, no M/G/R  Respiratory: CTABL; no W/R/R  Gastrointestinal: soft, nontender, nondistended. bowel sounds present.  Back: curvature to spine, b/l low back tenderness but worse over bony prominence on L. side  Skin: no ulcerations or visible rashes appreciated  Extremities: WWP; no edema, clubbing or cyanosis  Vascular: 2+ radial, DP/PT pulses B/L  Neurological: AAOx3; CN II-XII grossly intact; no focal deficits    MEDICATIONS:  MEDICATIONS  (STANDING):  diVALproex  milliGRAM(s) Oral daily  enoxaparin Injectable 40 milliGRAM(s) SubCutaneous every 24 hours  lidocaine   Patch 2 Patch Transdermal every 24 hours  valACYclovir 500 milliGRAM(s) Oral daily    MEDICATIONS  (PRN):  acetaminophen   Tablet .. 650 milliGRAM(s) Oral every 6 hours PRN Moderate Pain (4 - 6)  SUMAtriptan 50 milliGRAM(s) Oral daily PRN Migraine      ALLERGIES:  Allergies    No Known Allergies    Intolerances        LABS:                        9.7    8.83  )-----------( 360      ( 20 Aug 2020 11:49 )             32.3     08-20    141  |  101  |  7   ----------------------------<  125<H>  3.4<L>   |  26  |  0.69    Ca    10.1      20 Aug 2020 11:49    TPro  8.0  /  Alb  4.2  /  TBili  0.2  /  DBili  x   /  AST  20  /  ALT  5<L>  /  AlkPhos  509<H>  08-20    PT/INR - ( 20 Aug 2020 11:49 )   PT: 13.5 sec;   INR: 1.13          PTT - ( 20 Aug 2020 11:49 )  PTT:32.0 sec    CAPILLARY BLOOD GLUCOSE      POCT Blood Glucose.: 109 mg/dL (21 Aug 2020 08:31)      RADIOLOGY & ADDITIONAL TESTS: Reviewed.

## 2020-08-21 NOTE — DISCHARGE NOTE PROVIDER - NSDCCPTREATMENT_GEN_ALL_CORE_FT
PRINCIPAL PROCEDURE  Procedure: PET scan dx lung cancer  Findings and Treatment: FDG avid left lung mass with a right hilar lymph node metastasis, at least five liver metastases, and diffuse skeletal osseous metastases. There is a pleural metastasis overlying an abnormal right posterior 7th rib. There are also probable metastases to the anterior mediastinum and supraclavicular lymph nodes.

## 2020-08-21 NOTE — DISCHARGE NOTE PROVIDER - HOSPITAL COURSE
#Discharge: do not delete        Patient is a 53 y/o F with migraines, sciatica and significant smoking history (about 5 cigarettes day for 38 years) who presents to ED after CT scan showed concern for PE and new metastatic lesions, admitted for new malignancy with metastasis seen on CT, likely primary lung carcinoma.         Problem List/Main Diagnoses (system-based):     #Malignancy    pt presenting with weight loss, smoking history, and back pain in the setting of newly found lung mass on CT. Likely primary lung carcinoma,  small cell lung carcinoma given smoking hx and extent of metastasis     - Heme/onc consulted    - f/u IR guided biopsy    - f/u PET scan     - MRI head, MRI cervical spine, MRI thoracic spine to be performed outpatient         Problem/Plan - 2:    ·  Problem: Migraine without status migrainosus, not intractable, unspecified migraine type.  Plan: history of migraines, takes Depakote for prophylaxis and sumatriptan when needed     - also takes Excedrin if needed     - continue Depakote and Sumatriptan.          Problem/Plan - 3:    ·  Problem: HSV infection.  Plan: Continue patient's home Valtrex.         #chronic bilateral thoracic back pain    worsening, potentially 2/2 osseous mets    - MRI thoracic spine outpatient            Inpatient treatment course: CT c/a/p indicating left lung mass consistent with malignancy, with left supraclavicular and right hilar mets, suspected hepatic and pleural mets, and diffuse osseus mets.  IR guided biopsy still pending final reading.  PET scan performed still pending final reading.    New medications:     Labs to be followed outpatient: IR guided biopsy, PET scan    Exam to be followed outpatient: #Discharge: do not delete        Patient is a 53 y/o F with migraines, sciatica and significant smoking history (about 5 cigarettes day for 38 years) who presents to ED after CT scan showed concern for PE and new metastatic lesions, admitted for new malignancy with metastasis seen on CT, likely primary lung carcinoma.         Problem List/Main Diagnoses (system-based):     #Malignancy    pt presenting with weight loss, smoking history, and back pain in the setting of newly found lung mass on CT. Likely primary lung carcinoma,  small cell lung carcinoma given smoking hx and extent of metastasis     - Heme/onc consulted    - f/u IR guided biopsy    - f/u PET scan     - MRI head, MRI cervical spine, MRI thoracic spine to be performed outpatient        #chronic bilateral thoracic back pain    worsening, potentially 2/2 osseous mets    - MRI thoracic spine outpatient            #Migraine without status migrainosus, not intractable, unspecified migraine type    history of migraines, takes Depakote for prophylaxis and sumatriptan when needed     - also takes Excedrin if needed     - continue Depakote and Sumatriptan            #HSV infection    -Continue patient's home Valtrex            Inpatient treatment course: CT c/a/p indicating left lung mass consistent with malignancy, with left supraclavicular and right hilar mets, suspected hepatic and pleural mets, and diffuse osseus mets.  IR guided biopsy still pending final reading.  PET scan performed still pending final reading.    New medications:     Labs to be followed outpatient: IR guided biopsy, PET scan    Exam to be followed outpatient: MRI head, MRI cervical spine, MRI thoracic spine #Discharge: do not delete        Patient is a 53 y/o F with migraines, sciatica and significant smoking history (about 5 cigarettes day for 38 years) who presents to ED after CT scan showed concern for PE and new metastatic lesions, admitted for new malignancy with metastasis seen on CT, likely primary lung carcinoma.         Problem List/Main Diagnoses (system-based):     #Malignancy    pt presenting with weight loss, smoking history, and back pain in the setting of newly found lung mass on CT. Likely primary lung carcinoma,  small cell lung carcinoma given smoking hx and extent of metastasis     - Heme/onc consulted    - f/u IR guided biopsy    - PET showing FDG avid left lung mass with a right hilar lymph node metastasis, at least five liver metastases, and diffuse skeletal osseous metastases. There is a pleural metastasis overlying an abnormal right posterior 7th rib. There are also probable metastases to the anterior mediastinum and supraclavicular lymph nodes.    - MRI head, MRI cervical spine, MRI thoracic spine to be performed outpatient        #chronic bilateral thoracic back pain    worsening, potentially 2/2 osseous mets    - MRI thoracic spine outpatient            #Migraine without status migrainosus, not intractable, unspecified migraine type    history of migraines, takes Depakote for prophylaxis and sumatriptan when needed     - also takes Excedrin if needed     - continue Depakote and Sumatriptan        #HSV infection    -Continue patient's home Valtrex            Inpatient treatment course: CT c/a/p indicating left lung mass consistent with malignancy, with left supraclavicular and right hilar mets, suspected hepatic and pleural mets, and diffuse osseus mets.  IR guided biopsy still pending final reading    New medications:     Labs to be followed outpatient: IR guided biopsy    Exam to be followed outpatient: MRI head, MRI cervical spine, MRI thoracic spine #Discharge: do not delete        Patient is a 53 y/o F with migraines, sciatica and significant smoking history (about 5 cigarettes day for 38 years) who presents to ED after CT scan showed concern for PE and new metastatic lesions, admitted for new malignancy with metastasis seen on CT, likely primary lung carcinoma.         Problem List/Main Diagnoses (system-based):     #Malignancy    pt presenting with weight loss, smoking history, and back pain in the setting of newly found lung mass on CT. Likely primary lung carcinoma,  small cell lung carcinoma given smoking hx and extent of metastasis     - Heme/onc consulted    - f/u IR guided biopsy    - PET showing FDG avid left lung mass with a right hilar lymph node metastasis, at least five liver metastases, and diffuse skeletal osseous metastases. There is a pleural metastasis overlying an abnormal right posterior 7th rib. There are also probable metastases to the anterior mediastinum and supraclavicular lymph nodes.    - MRI head, MRI cervical spine, MRI thoracic spine to be performed outpatient        #chronic bilateral thoracic back pain    worsening, potentially 2/2 osseous mets    - MRI thoracic spine outpatient            #Migraine without status migrainosus, not intractable, unspecified migraine type    history of migraines, takes Depakote for prophylaxis and sumatriptan when needed     - also takes Excedrin if needed     - continue Depakote and Sumatriptan        #HSV infection    -Continue patient's home Valtrex        #leukocytosis    patient with wbc of 12 on discharge, Temperature taken orally 100.5 but then recheck and was 99.6 without intervention. patient asymptomatic, non toxic appearing, to follow up with Dr. Ruffin with repeat WBC.         Inpatient treatment course: CT c/a/p indicating left lung mass consistent with malignancy, with left supraclavicular and right hilar mets, suspected hepatic and pleural mets, and diffuse osseus mets.  IR guided biopsy still pending final reading    New medications:     Labs to be followed outpatient: IR guided biopsy, WBC     Exam to be followed outpatient: MRI cervical spine, MRI thoracic spine

## 2020-08-21 NOTE — DISCHARGE NOTE PROVIDER - NSDCCPCAREPLAN_GEN_ALL_CORE_FT
PRINCIPAL DISCHARGE DIAGNOSIS  Diagnosis: Metastatic lung carcinoma  Assessment and Plan of Treatment: You were found to have a lung mass on outpatient imaging and there was concern for a blood clot so you were sent to the hospital. Imaging showed no blood clot but did show the left lung mass with some metastesis to the surrounding lymph nodes and some bones and liver. You underwent a PET CT scan to better characterize the sites of malignancy. You also underwent a imaging guided biopsy to determine what type of malignancy you have. The needle insertio site may be tender for several days, please do not undergo any intense activty. If you have any bleeding at the site please call your doctor. You should follow up with your oncologist to plan the next steps of your treatment course. PRINCIPAL DISCHARGE DIAGNOSIS  Diagnosis: Metastatic lung carcinoma  Assessment and Plan of Treatment: You were found to have a lung mass on outpatient imaging and there was concern for a blood clot so you were sent to the hospital. Imaging showed no blood clot but did show the left lung mass with some metastesis to the surrounding lymph nodes and some bones and liver. You underwent a PET CT scan to better characterize the sites of malignancy. You also underwent a imaging guided biopsy to determine what type of malignancy you have. The needle insertio site may be tender for several days, please do not undergo any intense activty. If you have any bleeding at the site please call your doctor. You should follow up with your oncologist to plan the next steps of your treatment course.      SECONDARY DISCHARGE DIAGNOSES  Diagnosis: Elevated WBCs  Assessment and Plan of Treatment: You had a minimally elevated white blood cell count while hospitalized. This can be related to infection, your cancer, or stress. You did not have any symptoms or signs suggestive of infection. Please follow up with Dr. Ruffin and have a repeat CBC drawn for blood work to make sure this has resolved.

## 2020-08-21 NOTE — PROGRESS NOTE ADULT - SUBJECTIVE AND OBJECTIVE BOX
Hematology Progress Note    Interval Hx  Seen and examined. S/p PET and biopsy of liver lesion. Currently in some pain      HPI:  53 yo F was recently seen at Dr. Ruffin's office referred for evaluation of cervical adenopathy. States around the second week of July felt a lump in the left side of her neck, above the clavicle, described as painless. In addition she has lost weight recently, unintentionally. (>10lbs) Patient is a daily smoker but Denies ETOH abuse. Denies fevers, no night sweats, no dysphagia, no odynophagia. She denies any n/v, cough, shortness of breath, abdominal pain. Has had chronic musculoskeletal pain, sees a pain specialist. No headaches/dizziness.    Given these findings the patient had a CT of the neck performed yesterday  CT results as follows:   Findings consistent with metastatic disease involving the left frontal calvarium with intracranial extension as well as involving the cervical and thoracic spines;  Opacity within the left midlung zone on  images with right posterior pleural thickening and question of filling defects involving right pulmonary artery branches which can be seen in the setting of pulmonary emboli; Enlarged left thyroid lobe which demonstrates mediastinal extension with a nodule involving the posterior aspect of the left thyroid lobe.  Ovoid low density structure within the anterior right para midline tongue in the region in the medial aspect of the sublingual space, abutting the genioglossus muscle;    After the scan was read, patient was called today and advised to go the ED for a CTA to rule out Pulmonary embolism. CTA performed in the ED and without PE, however likely lung primary identified and liver and bony mets noted.     Patient will now be admitted for further workup, to complete staging, biopsy and possibly treatment.       Allergies:  No Known Allergies      MEDICATIONS  (PRN):  ondansetron Injectable 4 milliGRAM(s) IV Push once PRN Nausea and/or Vomiting      PAST MEDICAL & SURGICAL HISTORY:  HSV infection  Migraine  No significant past surgical history    FAMILY HISTORY:  Father: Lung Ca  Brother and Uncle: Leukemia     SOCIAL HISTORY: No EtOH, Smoker +    REVIEW OF SYSTEMS:    CONSTITUTIONAL: No weakness, fevers or chills  EYES/ENT: No visual changes;  No vertigo or throat pain   NECK: No pain or stiffness  RESPIRATORY: No cough, wheezing, hemoptysis; No shortness of breath  CARDIOVASCULAR: No chest pain or palpitations  GASTROINTESTINAL: No abdominal or epigastric pain. No nausea, vomiting, or hematemesis; No diarrhea or constipation. No melena or hematochezia.  GENITOURINARY: No dysuria, frequency or hematuria  NEUROLOGICAL: No numbness or weakness  SKIN: No itching, burning, rashes, or lesions   All other review of systems is negative unless indicated above.      Weight (kg): 43.5 (08-20 @ 11:28)    Vital Signs Last 24 Hrs  T(C): 37.6 (21 Aug 2020 06:02), Max: 38 (20 Aug 2020 19:43)  T(F): 99.7 (21 Aug 2020 06:02), Max: 100.4 (20 Aug 2020 19:43)  HR: 90 (21 Aug 2020 06:02) (89 - 104)  BP: 128/75 (21 Aug 2020 06:02) (128/75 - 144/78)  BP(mean): --  RR: 18 (21 Aug 2020 06:02) (17 - 18)  SpO2: 97% (21 Aug 2020 06:02) (97% - 99%)    GENERAL: NAD, thin  HEAD:  Atraumatic, Normocephalic  EYES: EOMI,  sclera clear  NECK: Supple, No JVD,  L supraclavicular LN +  CHEST/LUNG: Clear to auscultation bilaterally; No wheeze  HEART: Regular rate and rhythm; No murmurs, rubs, or gallops  ABDOMEN: Soft, Nontender, Nondistended; Bowel sounds present  EXTREMITIES:  2+ Peripheral Pulses, No clubbing, cyanosis, or edema  NEUROLOGY: non-focal  SKIN: No rashes or lesions

## 2020-08-21 NOTE — DISCHARGE NOTE PROVIDER - NSDCMRMEDTOKEN_GEN_ALL_CORE_FT
acetaminophen-oxycodone 325 mg-5 mg oral tablet: 1-2 tab(s) orally every 6 hours as needed for pain.  Do not drive while taking.  Do not take with alcohol.    GARFIELD# [id number]  Depakote  mg oral tablet, extended release: 1 tab(s) orally once a day  SUMAtriptan: 50 milligram(s) orally once a day  Valtrex: 500 milligram(s) orally once a day acetaminophen-oxycodone 325 mg-5 mg oral tablet: 1-2 tab(s) orally every 6 hours as needed for pain.  Do not drive while taking.  Do not take with alcohol.    GARFIELD# [id number]  Depakote  mg oral tablet, extended release: 1 tab(s) orally once a day  SUMAtriptan: 50 milligram(s) orally once a day  Valtrex: 500 milligram(s) orally once a day acetaminophen-oxycodone 325 mg-5 mg oral tablet: 1-2 tab(s) orally every 6 hours as needed for pain.  Do not drive while taking.  Do not take with alcohol.    GARFIELD# [id number]  Depakote  mg oral tablet, extended release: 1 tab(s) orally once a day  SUMAtriptan: 50 milligram(s) orally once a day  Valtrex: 500 milligram(s) orally once a day acetaminophen-oxycodone 325 mg-5 mg oral tablet: 1-2 tab(s) orally every 6 hours as needed for pain.  Do not drive while taking.  Do not take with alcohol.    GARFIELD# [id number]  Depakote  mg oral tablet, extended release: 1 tab(s) orally once a day  SUMAtriptan: 50 milligram(s) orally once a day  Valtrex: 500 milligram(s) orally once a day acetaminophen-oxycodone 325 mg-5 mg oral tablet: 1-2 tab(s) orally every 6 hours as needed for pain.  Do not drive while taking.  Do not take with alcohol.    GARFIELD# [id number]  Depakote  mg oral tablet, extended release: 1 tab(s) orally once a day  lidocaine 5% topical film: Apply topically to affected area once a day   SUMAtriptan: 50 milligram(s) orally once a day  Valtrex: 500 milligram(s) orally once a day acetaminophen-oxycodone 325 mg-5 mg oral tablet: 1-2 tab(s) orally every 6 hours as needed for pain.  Do not drive while taking.  Do not take with alcohol.    GARFIELD# [id number]  Depakote  mg oral tablet, extended release: 1 tab(s) orally once a day  lidocaine 5% topical film: Apply topically to affected area once a day   SUMAtriptan: 50 milligram(s) orally once a day  Valtrex: 500 milligram(s) orally once a day

## 2020-08-22 ENCOUNTER — TRANSCRIPTION ENCOUNTER (OUTPATIENT)
Age: 55
End: 2020-08-22

## 2020-08-22 VITALS — TEMPERATURE: 100 F

## 2020-08-22 LAB
ALBUMIN SERPL ELPH-MCNC: 3.6 G/DL — SIGNIFICANT CHANGE UP (ref 3.3–5)
ALP SERPL-CCNC: 391 U/L — HIGH (ref 40–120)
ALT FLD-CCNC: 12 U/L — SIGNIFICANT CHANGE UP (ref 10–45)
ANION GAP SERPL CALC-SCNC: 16 MMOL/L — SIGNIFICANT CHANGE UP (ref 5–17)
AST SERPL-CCNC: 26 U/L — SIGNIFICANT CHANGE UP (ref 10–40)
BASOPHILS # BLD AUTO: 0.08 K/UL — SIGNIFICANT CHANGE UP (ref 0–0.2)
BASOPHILS NFR BLD AUTO: 0.6 % — SIGNIFICANT CHANGE UP (ref 0–2)
BILIRUB SERPL-MCNC: 0.3 MG/DL — SIGNIFICANT CHANGE UP (ref 0.2–1.2)
BUN SERPL-MCNC: 12 MG/DL — SIGNIFICANT CHANGE UP (ref 7–23)
CALCIUM SERPL-MCNC: 10.4 MG/DL — SIGNIFICANT CHANGE UP (ref 8.4–10.5)
CHLORIDE SERPL-SCNC: 98 MMOL/L — SIGNIFICANT CHANGE UP (ref 96–108)
CO2 SERPL-SCNC: 25 MMOL/L — SIGNIFICANT CHANGE UP (ref 22–31)
CREAT SERPL-MCNC: 0.58 MG/DL — SIGNIFICANT CHANGE UP (ref 0.5–1.3)
EOSINOPHIL # BLD AUTO: 0.07 K/UL — SIGNIFICANT CHANGE UP (ref 0–0.5)
EOSINOPHIL NFR BLD AUTO: 0.6 % — SIGNIFICANT CHANGE UP (ref 0–6)
GLUCOSE SERPL-MCNC: 91 MG/DL — SIGNIFICANT CHANGE UP (ref 70–99)
HCT VFR BLD CALC: 27.2 % — LOW (ref 34.5–45)
HGB BLD-MCNC: 8.2 G/DL — LOW (ref 11.5–15.5)
IMM GRANULOCYTES NFR BLD AUTO: 1.3 % — SIGNIFICANT CHANGE UP (ref 0–1.5)
LYMPHOCYTES # BLD AUTO: 19.2 % — SIGNIFICANT CHANGE UP (ref 13–44)
LYMPHOCYTES # BLD AUTO: 2.37 K/UL — SIGNIFICANT CHANGE UP (ref 1–3.3)
MAGNESIUM SERPL-MCNC: 2 MG/DL — SIGNIFICANT CHANGE UP (ref 1.6–2.6)
MCHC RBC-ENTMCNC: 27 PG — SIGNIFICANT CHANGE UP (ref 27–34)
MCHC RBC-ENTMCNC: 30.1 GM/DL — LOW (ref 32–36)
MCV RBC AUTO: 89.5 FL — SIGNIFICANT CHANGE UP (ref 80–100)
MONOCYTES # BLD AUTO: 0.95 K/UL — HIGH (ref 0–0.9)
MONOCYTES NFR BLD AUTO: 7.7 % — SIGNIFICANT CHANGE UP (ref 2–14)
NEUTROPHILS # BLD AUTO: 8.71 K/UL — HIGH (ref 1.8–7.4)
NEUTROPHILS NFR BLD AUTO: 70.6 % — SIGNIFICANT CHANGE UP (ref 43–77)
NRBC # BLD: 0 /100 WBCS — SIGNIFICANT CHANGE UP (ref 0–0)
PHOSPHATE SERPL-MCNC: 3.1 MG/DL — SIGNIFICANT CHANGE UP (ref 2.5–4.5)
PLATELET # BLD AUTO: 274 K/UL — SIGNIFICANT CHANGE UP (ref 150–400)
POTASSIUM SERPL-MCNC: 4.2 MMOL/L — SIGNIFICANT CHANGE UP (ref 3.5–5.3)
POTASSIUM SERPL-SCNC: 4.2 MMOL/L — SIGNIFICANT CHANGE UP (ref 3.5–5.3)
PROT SERPL-MCNC: 6.7 G/DL — SIGNIFICANT CHANGE UP (ref 6–8.3)
RBC # BLD: 3.04 M/UL — LOW (ref 3.8–5.2)
RBC # FLD: 15.8 % — HIGH (ref 10.3–14.5)
SODIUM SERPL-SCNC: 139 MMOL/L — SIGNIFICANT CHANGE UP (ref 135–145)
WBC # BLD: 12.34 K/UL — HIGH (ref 3.8–10.5)
WBC # FLD AUTO: 12.34 K/UL — HIGH (ref 3.8–10.5)

## 2020-08-22 PROCEDURE — 74177 CT ABD & PELVIS W/CONTRAST: CPT

## 2020-08-22 PROCEDURE — 82962 GLUCOSE BLOOD TEST: CPT

## 2020-08-22 PROCEDURE — 36415 COLL VENOUS BLD VENIPUNCTURE: CPT

## 2020-08-22 PROCEDURE — 70551 MRI BRAIN STEM W/O DYE: CPT | Mod: 26

## 2020-08-22 PROCEDURE — 76942 ECHO GUIDE FOR BIOPSY: CPT

## 2020-08-22 PROCEDURE — 85025 COMPLETE CBC W/AUTO DIFF WBC: CPT

## 2020-08-22 PROCEDURE — 99233 SBSQ HOSP IP/OBS HIGH 50: CPT

## 2020-08-22 PROCEDURE — 71275 CT ANGIOGRAPHY CHEST: CPT

## 2020-08-22 PROCEDURE — 86850 RBC ANTIBODY SCREEN: CPT

## 2020-08-22 PROCEDURE — 85610 PROTHROMBIN TIME: CPT

## 2020-08-22 PROCEDURE — A9552: CPT

## 2020-08-22 PROCEDURE — 96360 HYDRATION IV INFUSION INIT: CPT

## 2020-08-22 PROCEDURE — 99152 MOD SED SAME PHYS/QHP 5/>YRS: CPT

## 2020-08-22 PROCEDURE — 87635 SARS-COV-2 COVID-19 AMP PRB: CPT

## 2020-08-22 PROCEDURE — 99285 EMERGENCY DEPT VISIT HI MDM: CPT | Mod: 25

## 2020-08-22 PROCEDURE — 86803 HEPATITIS C AB TEST: CPT

## 2020-08-22 PROCEDURE — 85730 THROMBOPLASTIN TIME PARTIAL: CPT

## 2020-08-22 PROCEDURE — 88305 TISSUE EXAM BY PATHOLOGIST: CPT

## 2020-08-22 PROCEDURE — 99153 MOD SED SAME PHYS/QHP EA: CPT

## 2020-08-22 PROCEDURE — 70551 MRI BRAIN STEM W/O DYE: CPT

## 2020-08-22 PROCEDURE — 88341 IMHCHEM/IMCYTCHM EA ADD ANTB: CPT

## 2020-08-22 PROCEDURE — 88173 CYTOPATH EVAL FNA REPORT: CPT

## 2020-08-22 PROCEDURE — 93005 ELECTROCARDIOGRAM TRACING: CPT

## 2020-08-22 PROCEDURE — 86901 BLOOD TYPING SEROLOGIC RH(D): CPT

## 2020-08-22 PROCEDURE — 78815 PET IMAGE W/CT SKULL-THIGH: CPT

## 2020-08-22 PROCEDURE — 84484 ASSAY OF TROPONIN QUANT: CPT

## 2020-08-22 PROCEDURE — 83735 ASSAY OF MAGNESIUM: CPT

## 2020-08-22 PROCEDURE — 84100 ASSAY OF PHOSPHORUS: CPT

## 2020-08-22 PROCEDURE — 80053 COMPREHEN METABOLIC PANEL: CPT

## 2020-08-22 PROCEDURE — 47000 NEEDLE BIOPSY OF LIVER PERQ: CPT

## 2020-08-22 PROCEDURE — 83880 ASSAY OF NATRIURETIC PEPTIDE: CPT

## 2020-08-22 RX ORDER — LIDOCAINE 4 G/100G
1 CREAM TOPICAL
Qty: 30 | Refills: 0
Start: 2020-08-22 | End: 2020-09-20

## 2020-08-22 RX ADMIN — Medication 15 MILLIGRAM(S): at 15:00

## 2020-08-22 RX ADMIN — ENOXAPARIN SODIUM 40 MILLIGRAM(S): 100 INJECTION SUBCUTANEOUS at 08:55

## 2020-08-22 RX ADMIN — LIDOCAINE 2 PATCH: 4 CREAM TOPICAL at 07:02

## 2020-08-22 RX ADMIN — Medication 15 MILLIGRAM(S): at 14:40

## 2020-08-22 RX ADMIN — VALACYCLOVIR 500 MILLIGRAM(S): 500 TABLET, FILM COATED ORAL at 14:39

## 2020-08-22 RX ADMIN — DIVALPROEX SODIUM 500 MILLIGRAM(S): 500 TABLET, DELAYED RELEASE ORAL at 14:39

## 2020-08-22 NOTE — PROGRESS NOTE ADULT - ASSESSMENT
55 yo F smoker was recently evaluated at Dr. Ruffin's office for painless cervical adenopathy, unintentional weight loss. Initial CT of the neck with evidence of metastatic disease and possible lung primary and PE. Sent to the ED for CTA evaluation and CT abdomen. CTA negative for PE.   Results form her scan in the ED:   1.  Left lung mass consistent with malignancy suspicious for primary which broadly contacts left major fissure and also pericardium.  2.  Left supraclavicular and right hilar adenopathy consistent with metastasis.  3.  Diffuse osseous metastases throughout the axial skeleton, sternum, and pelvis with multilevel compression deformities suspected pathologic bases as detailed above.  4.  Suspected hepatic and right pleural metastases.  5.  Punctate noncalcified subpleural nodule medial right middle lobe 0.2 cm, indeterminate, possibly metastasis    #Metastatic Cancer  - Likely lung primary with metastatic disease to bone, liver. (Small cell high in differential)  - ECOG PS 0-1  - S/p biopsy of liver lesion, will follow up path  - s/p PET scan  - Evaluated by pulm  - Recommend MRIs of the brain, cervical, thoracic spine with and without IV contrast  - If MRI not performed inpatient will get imaging at follow up appointment  - Has follow up with  on tuesday 2pm at [facility].       D/w Dr. Ruffin
Patient is a 53 y/o F with migraines, sciatica, scoliosis, and significant smoking history (about 5 cigarettes day for 38 years) who presents to ED after CT scan showed concern for PE and new metastatic lesions, admitted for new malignancy with metastasis seen on CT, likely primary lung carcinoma.
Patient is a 53 y/o F with migraines, sciatica, scoliosis, and significant smoking history (about 5 cigarettes day for 38 years) who presents to ED after CT scan showed concern for PE and new metastatic lesions, admitted for new malignancy with metastasis seen on CT, likely primary lung carcinoma.

## 2020-08-22 NOTE — PROGRESS NOTE ADULT - SUBJECTIVE AND OBJECTIVE BOX
Patient is a 54y old  Female who presents with a chief complaint of abnormal CT scan (21 Aug 2020 15:51)      SUBJECTIVE / OVERNIGHT EVENTS:  Reports some tenderness around liver biopsy site.  No bruising or pain out of proportion.    MEDICATIONS  (STANDING):  diVALproex  milliGRAM(s) Oral daily  enoxaparin Injectable 40 milliGRAM(s) SubCutaneous every 24 hours  ketorolac   Injectable 15 milliGRAM(s) IV Push once  lidocaine   Patch 2 Patch Transdermal every 24 hours  valACYclovir 500 milliGRAM(s) Oral daily    MEDICATIONS  (PRN):  acetaminophen   Tablet .. 650 milliGRAM(s) Oral every 6 hours PRN Moderate Pain (4 - 6)  SUMAtriptan 50 milliGRAM(s) Oral daily PRN Migraine      CAPILLARY BLOOD GLUCOSE        I&O's Summary      PHYSICAL EXAM:  Vital Signs Last 24 Hrs  T(C): 37.6 (22 Aug 2020 06:13), Max: 37.6 (22 Aug 2020 06:13)  T(F): 99.7 (22 Aug 2020 06:13), Max: 99.7 (22 Aug 2020 06:13)  HR: 99 (22 Aug 2020 06:13) (91 - 99)  BP: 127/72 (22 Aug 2020 06:13) (108/68 - 135/73)  BP(mean): --  RR: 18 (22 Aug 2020 06:13) (18 - 18)  SpO2: 94% (22 Aug 2020 06:13) (94% - 98%)  GENERAL: NAD, well-developed  HEAD:  Atraumatic, Normocephalic  EYES: EOMI, PERRLA, conjunctiva and sclera clear  NECK: Supple, No JVD  CHEST/LUNG: Clear to auscultation bilaterally; No wheeze  HEART: Regular rate and rhythm; No murmurs, rubs, or gallops  ABDOMEN: Soft, minimally tender around RUQ biopsy site, Nondistended; Bowel sounds present  EXTREMITIES:  2+ Peripheral Pulses, No clubbing, cyanosis, or edema  PSYCH: AAOx3  NEUROLOGY: non-focal  SKIN: No rashes or lesions    LABS:                        8.2    10.61 )-----------( 305      ( 21 Aug 2020 16:47 )             27.3     08-21    139  |  100  |  7   ----------------------------<  139<H>  3.8   |  24  |  0.60    Ca    9.8      21 Aug 2020 16:47  Phos  3.8     08-21  Mg     2.3     08-21    TPro  7.1  /  Alb  3.8  /  TBili  0.3  /  DBili  x   /  AST  38  /  ALT  14  /  AlkPhos  436<H>  08-21    PT/INR - ( 21 Aug 2020 16:47 )   PT: 14.5 sec;   INR: 1.22          PTT - ( 21 Aug 2020 16:47 )  PTT:28.2 sec  CARDIAC MARKERS ( 20 Aug 2020 11:49 )  x     / <0.01 ng/mL / x     / x     / x              RADIOLOGY & ADDITIONAL TESTS:    Imaging Personally Reviewed:    Consultant(s) Notes Reviewed:      Care Discussed with Consultants/Other Providers:

## 2020-08-22 NOTE — PROGRESS NOTE ADULT - PROBLEM SELECTOR PLAN 5
F: none   E: replete as needed  N: Likely discharge today.   DVT ppx: Lovenox (khorana score for VTE= 3, high risk)   CODE: FULL  Dispo: RMF
F: none   E: replete as needed  N: NPO after midnight   DVT ppx: Lovenox (khorana score for VTE= 3, high risk)   CODE: FULL  Dispo: RM

## 2020-08-22 NOTE — PROGRESS NOTE ADULT - ATTENDING COMMENTS
Addendum:  Patient was noted to have low grade temperatures throughout admission.  Attributed to widely metastatic cancer. No active signs or symptoms of infection were noted. Patient was non toxic, and in no distress. Patient deferred workup today for any infectious causes, as she wished to be discharged and wished to follow up with Oncology on Tuesday. Informed her that if fevers occur, if she develops cough, worsening abdominal pain, diarrhea, or any other symptoms of infection, to return to ED. Patient reported understanding.

## 2020-08-22 NOTE — PROGRESS NOTE ADULT - PROBLEM SELECTOR PLAN 2
- History of migraines, takes Depakote for prophylaxis and sumatriptan when   needed   - also takes Excedrin if needed. Avoid for now given recent liver biopsy.   - continue Depakote and Sumatriptan
history of migraines, takes Depakote for prophylaxis and sumatriptan when needed   - also takes Excedrin if needed   - continue Depakote and Sumatriptan

## 2020-08-22 NOTE — PROGRESS NOTE ADULT - PROBLEM SELECTOR PLAN 1
- Patient presenting with weight loss, smoking history, and back pain in the setting   of newly found lung mass on CT. Likely primary lung carcinoma,  small cell lung   carcinoma given smoking hx and extent of metastasis   - CT c/a/p: Left lung mass consistent with malignancy, with left supraclavicular and   right hilar mets, suspected hepatic and pleural mets, and diffuse osseus mets  - Heme/onc consult appreciated.  - Outpatient follow up on Tuesday.    - IR guided biopsy performed; f/u results  - PET Scan with large left lung mass as well as liver mets and significant bony mets.  - f/u MRI head, MRI cervical spine, MRI thoracic spine. if not performed in hospital,   - f/u apointment with Dr. Ruffin on 8/25 at 2pm    Patient with some pain around biopsy site.  CBC to be done today to evaluate H and H, after biopsy.
pt presenting with weight loss, smoking history, and back pain in the setting of newly found lung mass on CT. Likely primary lung carcinoma,  small cell lung carcinoma given smoking hx and extent of metastasis   - CT c/a/p: Left lung mass consistent with malignancy, with left supraclavicular and right hilar mets, suspected hepatic and pleural mets, and diffuse osseus mets  - Heme/onc consulted  - IR guided biopsy performed; f/u results  - f/u PET scan results  - f/u MRI head, MRI cervical spine, MRI thoracic spine. if not performed in hospital, f/u outpatient.  -f/u apointment with Dr. Ruffin on 8/25 at 2pm

## 2020-08-22 NOTE — DISCHARGE NOTE NURSING/CASE MANAGEMENT/SOCIAL WORK - PATIENT PORTAL LINK FT
You can access the FollowMyHealth Patient Portal offered by [facility] by registering at the following website: [email or web address]. By joining [employer]’s FollowMyHealth portal, you will also be able to view your health information using other applications (apps) compatible with our system.

## 2020-08-22 NOTE — PROGRESS NOTE ADULT - PROBLEM SELECTOR PLAN 4
- Patient with back pain, worsening recently. likely 2/2 osseus mets  - Lidocaine patch, tylenol 650 mg prn  - Improved currently. Tylenol as needed.  - f/u MRI thoracic spine

## 2020-08-24 LAB — NON-GYNECOLOGICAL CYTOLOGY STUDY: SIGNIFICANT CHANGE UP

## 2020-08-25 LAB — SURGICAL PATHOLOGY STUDY: SIGNIFICANT CHANGE UP

## 2020-08-26 DIAGNOSIS — B00.9 HERPESVIRAL INFECTION, UNSPECIFIED: ICD-10-CM

## 2020-08-26 DIAGNOSIS — C77.1 SECONDARY AND UNSPECIFIED MALIGNANT NEOPLASM OF INTRATHORACIC LYMPH NODES: ICD-10-CM

## 2020-08-26 DIAGNOSIS — M54.9 DORSALGIA, UNSPECIFIED: ICD-10-CM

## 2020-08-26 DIAGNOSIS — G43.909 MIGRAINE, UNSPECIFIED, NOT INTRACTABLE, WITHOUT STATUS MIGRAINOSUS: ICD-10-CM

## 2020-08-26 DIAGNOSIS — C78.7 SECONDARY MALIGNANT NEOPLASM OF LIVER AND INTRAHEPATIC BILE DUCT: ICD-10-CM

## 2020-08-26 DIAGNOSIS — F17.210 NICOTINE DEPENDENCE, CIGARETTES, UNCOMPLICATED: ICD-10-CM

## 2020-08-26 DIAGNOSIS — D64.9 ANEMIA, UNSPECIFIED: ICD-10-CM

## 2020-08-26 DIAGNOSIS — R63.4 ABNORMAL WEIGHT LOSS: ICD-10-CM

## 2020-08-26 DIAGNOSIS — C79.51 SECONDARY MALIGNANT NEOPLASM OF BONE: ICD-10-CM

## 2020-08-26 DIAGNOSIS — D72.829 ELEVATED WHITE BLOOD CELL COUNT, UNSPECIFIED: ICD-10-CM

## 2020-08-26 DIAGNOSIS — C34.90 MALIGNANT NEOPLASM OF UNSPECIFIED PART OF UNSPECIFIED BRONCHUS OR LUNG: ICD-10-CM

## 2020-08-26 DIAGNOSIS — G89.29 OTHER CHRONIC PAIN: ICD-10-CM

## 2020-08-26 DIAGNOSIS — C78.2 SECONDARY MALIGNANT NEOPLASM OF PLEURA: ICD-10-CM

## 2020-08-26 DIAGNOSIS — M54.30 SCIATICA, UNSPECIFIED SIDE: ICD-10-CM

## 2020-09-29 ENCOUNTER — EMERGENCY (EMERGENCY)
Facility: HOSPITAL | Age: 55
LOS: 1 days | Discharge: HOME | End: 2020-09-29
Attending: EMERGENCY MEDICINE | Admitting: EMERGENCY MEDICINE
Payer: COMMERCIAL

## 2020-09-29 VITALS
WEIGHT: 93.04 LBS | HEIGHT: 60 IN | DIASTOLIC BLOOD PRESSURE: 93 MMHG | HEART RATE: 126 BPM | RESPIRATION RATE: 18 BRPM | OXYGEN SATURATION: 98 % | SYSTOLIC BLOOD PRESSURE: 147 MMHG | TEMPERATURE: 99 F

## 2020-09-29 VITALS
TEMPERATURE: 99 F | OXYGEN SATURATION: 96 % | SYSTOLIC BLOOD PRESSURE: 131 MMHG | HEART RATE: 103 BPM | DIASTOLIC BLOOD PRESSURE: 76 MMHG | RESPIRATION RATE: 18 BRPM

## 2020-09-29 DIAGNOSIS — M54.5 LOW BACK PAIN: ICD-10-CM

## 2020-09-29 DIAGNOSIS — C79.49 SECONDARY MALIGNANT NEOPLASM OF OTHER PARTS OF NERVOUS SYSTEM: ICD-10-CM

## 2020-09-29 DIAGNOSIS — G95.20 UNSPECIFIED CORD COMPRESSION: ICD-10-CM

## 2020-09-29 DIAGNOSIS — Z20.828 CONTACT WITH AND (SUSPECTED) EXPOSURE TO OTHER VIRAL COMMUNICABLE DISEASES: ICD-10-CM

## 2020-09-29 DIAGNOSIS — Z90.89 ACQUIRED ABSENCE OF OTHER ORGANS: Chronic | ICD-10-CM

## 2020-09-29 DIAGNOSIS — C34.90 MALIGNANT NEOPLASM OF UNSPECIFIED PART OF UNSPECIFIED BRONCHUS OR LUNG: ICD-10-CM

## 2020-09-29 PROBLEM — B00.9 HERPESVIRAL INFECTION, UNSPECIFIED: Chronic | Status: ACTIVE | Noted: 2020-08-20

## 2020-09-29 PROBLEM — G43.909 MIGRAINE, UNSPECIFIED, NOT INTRACTABLE, WITHOUT STATUS MIGRAINOSUS: Chronic | Status: ACTIVE | Noted: 2020-08-20

## 2020-09-29 LAB
ALBUMIN SERPL ELPH-MCNC: 4.2 G/DL — SIGNIFICANT CHANGE UP (ref 3.3–5)
ALP SERPL-CCNC: 766 U/L — HIGH (ref 40–120)
ALT FLD-CCNC: <5 U/L — LOW (ref 10–45)
ANION GAP SERPL CALC-SCNC: 13 MMOL/L — SIGNIFICANT CHANGE UP (ref 5–17)
APTT BLD: 31.4 SEC — SIGNIFICANT CHANGE UP (ref 27.5–35.5)
AST SERPL-CCNC: 15 U/L — SIGNIFICANT CHANGE UP (ref 10–40)
BASOPHILS # BLD AUTO: 0.04 K/UL — SIGNIFICANT CHANGE UP (ref 0–0.2)
BASOPHILS NFR BLD AUTO: 0.5 % — SIGNIFICANT CHANGE UP (ref 0–2)
BILIRUB SERPL-MCNC: 0.2 MG/DL — SIGNIFICANT CHANGE UP (ref 0.2–1.2)
BLD GP AB SCN SERPL QL: NEGATIVE — SIGNIFICANT CHANGE UP
BUN SERPL-MCNC: 7 MG/DL — SIGNIFICANT CHANGE UP (ref 7–23)
CALCIUM SERPL-MCNC: 9.9 MG/DL — SIGNIFICANT CHANGE UP (ref 8.4–10.5)
CHLORIDE SERPL-SCNC: 96 MMOL/L — SIGNIFICANT CHANGE UP (ref 96–108)
CO2 SERPL-SCNC: 27 MMOL/L — SIGNIFICANT CHANGE UP (ref 22–31)
CREAT SERPL-MCNC: 0.55 MG/DL — SIGNIFICANT CHANGE UP (ref 0.5–1.3)
EOSINOPHIL # BLD AUTO: 0.47 K/UL — SIGNIFICANT CHANGE UP (ref 0–0.5)
EOSINOPHIL NFR BLD AUTO: 6.4 % — HIGH (ref 0–6)
GLUCOSE SERPL-MCNC: 110 MG/DL — HIGH (ref 70–99)
HCT VFR BLD CALC: 25 % — LOW (ref 34.5–45)
HGB BLD-MCNC: 7.2 G/DL — LOW (ref 11.5–15.5)
IMM GRANULOCYTES NFR BLD AUTO: 1.4 % — SIGNIFICANT CHANGE UP (ref 0–1.5)
INR BLD: 1.11 — SIGNIFICANT CHANGE UP (ref 0.88–1.16)
LYMPHOCYTES # BLD AUTO: 1.68 K/UL — SIGNIFICANT CHANGE UP (ref 1–3.3)
LYMPHOCYTES # BLD AUTO: 22.9 % — SIGNIFICANT CHANGE UP (ref 13–44)
MCHC RBC-ENTMCNC: 25.8 PG — LOW (ref 27–34)
MCHC RBC-ENTMCNC: 28.8 GM/DL — LOW (ref 32–36)
MCV RBC AUTO: 89.6 FL — SIGNIFICANT CHANGE UP (ref 80–100)
MONOCYTES # BLD AUTO: 0.73 K/UL — SIGNIFICANT CHANGE UP (ref 0–0.9)
MONOCYTES NFR BLD AUTO: 9.9 % — SIGNIFICANT CHANGE UP (ref 2–14)
NEUTROPHILS # BLD AUTO: 4.33 K/UL — SIGNIFICANT CHANGE UP (ref 1.8–7.4)
NEUTROPHILS NFR BLD AUTO: 58.9 % — SIGNIFICANT CHANGE UP (ref 43–77)
NRBC # BLD: 0 /100 WBCS — SIGNIFICANT CHANGE UP (ref 0–0)
PLATELET # BLD AUTO: 317 K/UL — SIGNIFICANT CHANGE UP (ref 150–400)
POTASSIUM SERPL-MCNC: 4.3 MMOL/L — SIGNIFICANT CHANGE UP (ref 3.5–5.3)
POTASSIUM SERPL-SCNC: 4.3 MMOL/L — SIGNIFICANT CHANGE UP (ref 3.5–5.3)
PROT SERPL-MCNC: 7.5 G/DL — SIGNIFICANT CHANGE UP (ref 6–8.3)
PROTHROM AB SERPL-ACNC: 13.2 SEC — SIGNIFICANT CHANGE UP (ref 10.6–13.6)
RBC # BLD: 2.79 M/UL — LOW (ref 3.8–5.2)
RBC # FLD: 18.3 % — HIGH (ref 10.3–14.5)
RH IG SCN BLD-IMP: POSITIVE — SIGNIFICANT CHANGE UP
SARS-COV-2 RNA SPEC QL NAA+PROBE: SIGNIFICANT CHANGE UP
SODIUM SERPL-SCNC: 136 MMOL/L — SIGNIFICANT CHANGE UP (ref 135–145)
WBC # BLD: 7.35 K/UL — SIGNIFICANT CHANGE UP (ref 3.8–10.5)
WBC # FLD AUTO: 7.35 K/UL — SIGNIFICANT CHANGE UP (ref 3.8–10.5)

## 2020-09-29 PROCEDURE — 85730 THROMBOPLASTIN TIME PARTIAL: CPT

## 2020-09-29 PROCEDURE — 86850 RBC ANTIBODY SCREEN: CPT

## 2020-09-29 PROCEDURE — 99284 EMERGENCY DEPT VISIT MOD MDM: CPT

## 2020-09-29 PROCEDURE — 36415 COLL VENOUS BLD VENIPUNCTURE: CPT

## 2020-09-29 PROCEDURE — 85025 COMPLETE CBC W/AUTO DIFF WBC: CPT

## 2020-09-29 PROCEDURE — 86900 BLOOD TYPING SEROLOGIC ABO: CPT

## 2020-09-29 PROCEDURE — 85610 PROTHROMBIN TIME: CPT

## 2020-09-29 PROCEDURE — 86901 BLOOD TYPING SEROLOGIC RH(D): CPT

## 2020-09-29 PROCEDURE — U0003: CPT

## 2020-09-29 PROCEDURE — 80053 COMPREHEN METABOLIC PANEL: CPT

## 2020-09-29 RX ORDER — OXYCODONE AND ACETAMINOPHEN 5; 325 MG/1; MG/1
1 TABLET ORAL ONCE
Refills: 0 | Status: DISCONTINUED | OUTPATIENT
Start: 2020-09-29 | End: 2020-09-29

## 2020-09-29 RX ADMIN — OXYCODONE AND ACETAMINOPHEN 1 TABLET(S): 5; 325 TABLET ORAL at 17:00

## 2020-09-29 RX ADMIN — OXYCODONE AND ACETAMINOPHEN 1 TABLET(S): 5; 325 TABLET ORAL at 16:29

## 2020-09-29 RX ADMIN — OXYCODONE AND ACETAMINOPHEN 1 TABLET(S): 5; 325 TABLET ORAL at 21:49

## 2020-09-29 RX ADMIN — OXYCODONE AND ACETAMINOPHEN 1 TABLET(S): 5; 325 TABLET ORAL at 22:13

## 2020-09-29 NOTE — ED ADULT NURSE NOTE - CHPI ED NUR SYMPTOMS NEG
no bladder dysfunction/no fatigue/no anorexia/no numbness/no tingling/no bowel dysfunction/no motor function loss/no neck tenderness

## 2020-09-29 NOTE — ED PROVIDER NOTE - NSFOLLOWUPINSTRUCTIONS_ED_ALL_ED_FT
Take a stool softener (ie MiraLax or Dulcolax) for constipation.  You need to have a NONCONTRAST CT SCAN OF THORACIC SPINE.  Please discuss and get a prescription for this when you see Dr Ruffin this Thursday.  Please see Dr D'Amico in 1-2 weeks - you may need a referral from your primary care provider.    Return to the Emergency Department if you have any new or worsening symptoms, or if you have any concerns.  ===================    Back Pain    WHAT YOU NEED TO KNOW:    Back pain is common. It can be caused by many conditions, such as arthritis or the breakdown of spinal discs. Your risk for back pain is increased by injuries, lack of activity, or repeated bending and twisting. You may feel sore or stiff on one or both sides of your back. The pain may spread to your buttocks or thighs.    DISCHARGE INSTRUCTIONS:    Return to the emergency department if:   •You have pain, numbness, or weakness in one or both legs.      •Your pain becomes so severe that you cannot walk.      •You cannot control your urine or bowel movements.      •You have severe back pain with chest pain.      •You have severe back pain, nausea, and vomiting.      •You have severe back pain that spreads to your side or genital area.      Contact your healthcare provider if:   •You have back pain that does not get better with rest and pain medicine.      •You have a fever.      •You have pain that worsens when you are on your back or when you rest.      •You have pain that worsens when you cough or sneeze.      •You lose weight without trying.      •You have questions or concerns about your condition or care.      Medicines:   •NSAIDs help decrease swelling and pain. This medicine is available with or without a doctor's order. NSAIDs can cause stomach bleeding or kidney problems in certain people. If you take blood thinner medicine, always ask your healthcare provider if NSAIDs are safe for you. Always read the medicine label and follow directions.      •Acetaminophen decreases pain and fever. It is available without a doctor's order. Ask how much to take and how often to take it. Follow directions. Read the labels of all other medicines you are using to see if they also contain acetaminophen, or ask your doctor or pharmacist. Acetaminophen can cause liver damage if not taken correctly. Do not use more than 4 grams (4,000 milligrams) total of acetaminophen in one day.       •Muscle relaxers help decrease muscle spasms and back pain.      •Prescription pain medicine may be given. Ask your healthcare provider how to take this medicine safely. Some prescription pain medicines contain acetaminophen. Do not take other medicines that contain acetaminophen without talking to your healthcare provider. Too much acetaminophen may cause liver damage. Prescription pain medicine may cause constipation. Ask your healthcare provider how to prevent or treat constipation.       •Take your medicine as directed. Contact your healthcare provider if you think your medicine is not helping or if you have side effects. Tell him or her if you are allergic to any medicine. Keep a list of the medicines, vitamins, and herbs you take. Include the amounts, and when and why you take them. Bring the list or the pill bottles to follow-up visits. Carry your medicine list with you in case of an emergency.      How to manage your back pain:   •Apply ice on your back for 15 to 20 minutes every hour or as directed. Use an ice pack, or put crushed ice in a plastic bag. Cover it with a towel before you apply it to your skin. Ice helps prevent tissue damage and decreases pain.      •Apply heat on your back for 20 to 30 minutes every 2 hours for as many days as directed. Heat helps decrease pain and muscle spasms.      •Stay active as much as you can without causing more pain. Bed rest could make your back pain worse. Avoid heavy lifting until your pain is gone.      •Go to physical therapy as directed. A physical therapist can teach you exercises to help improve movement and strength, and to decrease pain.      Follow up with your healthcare provider in 2 weeks, or as directed: Write down your questions so you remember to ask them during your visits.

## 2020-09-29 NOTE — ED PROVIDER NOTE - CARE PROVIDER_API CALL
DAmico, Randy S (MD)  Neurosurgery  [address] [address]  Phone: [phone]  Fax: [phone]  Follow Up Time: 7-10 Days    Fabiola Ruffin  HEMATOLOGY/ONCOLOGY  [address] [address]  Phone: [phone]  Fax: [phone]  Follow Up Time:

## 2020-09-29 NOTE — ED PROVIDER NOTE - OBJECTIVE STATEMENT
54 yo F with pmh of migraines, scoliosis, lung ca on chemo (1st treatment... with mets to liver and spine c/o mid to lower back pain since June. Pt on oxycodone which helps with her pain.  Pain worse when lying flat. Denies fever, chills. 56 yo F with pmh of migraines, scoliosis, lung ca on chemo (1st treatment 9/15) with mets to liver and spine c/o mid to lower back pain since June. Pt on oxycodone which helps with her pain.  Pain worse when lying flat. Denies fever, chills. Pt states she had an MRI last week and Dr. Ruffin called her to tell her there was concern for the tumor "pressing on the spine." As per Dr. Ribera Pt has MRI of T/L spine which showed concern for cord compression at T2, T8. Pt denies fever, chills, n/v, dysuria, hematuria, abd pain, incontinence, saddle anesthesia, LE weakness.

## 2020-09-29 NOTE — CONSULT NOTE ADULT - SUBJECTIVE AND OBJECTIVE BOX
NEUROSURGERY CONSUL NOTE:    ED Provider Note:  56 yo F with pmh of migraines, scoliosis, lung ca on chemo (1st treatment 9/15) with mets to liver and spine c/o mid to lower back pain since June. Pt on oxycodone which helps with her pain.  Pain worse when lying flat. Denies fever, chills. Pt states she had an MRI last week and Dr. Ruffin called her to tell her there was concern for the tumor "pressing on the spine." As per Dr. Ribera Pt has MRI of T/L spine which showed concern for cord compression at T2, T8. Pt denies fever, chills, n/v, dysuria, hematuria, abd pain, incontinence, saddle anesthesia, LE weakness.      Vital Signs Last 24 Hrs  T(C): 36.7 (29 Sep 2020 15:34), Max: 37.3 (29 Sep 2020 12:45)  T(F): 98 (29 Sep 2020 15:34), Max: 99.2 (29 Sep 2020 12:45)  HR: 104 (29 Sep 2020 15:34) (104 - 126)  BP: 123/78 (29 Sep 2020 15:34) (123/78 - 147/93)  BP(mean): --  RR: 18 (29 Sep 2020 15:34) (18 - 18)  SpO2: 99% (29 Sep 2020 15:34) (98% - 99%)    I&O's Detail    I&O's Summary      PHYSICAL EXAM:  Gen: Laying in stretcher. NAD  Exts: 2+ pulses throughout. Skin warm, dry.  Neuro: AAO x3. Full 5/5 strength uppers and lowers. SLIT. No hoffmans. No signs of myelopathy.     TUBES/LINES:  [] CVC  [] A-line  [] Lumbar Drain  [] Ventriculostomy  [] Other    DIET:  [] NPO  [] Mechanical  [] Tube feeds    LABS:                        7.2    7.35  )-----------( 317      ( 29 Sep 2020 13:52 )             25.0     09-29    136  |  96  |  7   ----------------------------<  110<H>  4.3   |  27  |  0.55    Ca    9.9      29 Sep 2020 13:52    TPro  7.5  /  Alb  4.2  /  TBili  0.2  /  DBili  x   /  AST  15  /  ALT  <5<L>  /  AlkPhos  766<H>  09-29    PT/INR - ( 29 Sep 2020 13:52 )   PT: 13.2 sec;   INR: 1.11          PTT - ( 29 Sep 2020 13:52 )  PTT:31.4 sec        CAPILLARY BLOOD GLUCOSE          Drug Levels: [] N/A    CSF Analysis: [] N/A      Allergies    No Known Allergies    Intolerances      MEDICATIONS:  Antibiotics:    Neuro:    Anticoagulation:    OTHER:    IVF:    CULTURES:    RADIOLOGY & ADDITIONAL TESTS:  MRI reviewed with Dr. D'Amico. MRI done 9/23 at [facility] Rad[facility]         Family history of leukemia    FHx: throat cancer    Family hx of lung cancer    MEWS Score    HSV infection    Migraine    No pertinent past medical history    No pertinent past medical history    History of tonsillectomy    No significant past surgical history    No significant past surgical history    No significant past surgical history    BACK PAIN    37    [email or web address]        PLAN:  Recommendations will be made following patients Oncologist recommendation/report due to multiple metastases    Plan d/w Dr. D'Amico and primary care ED team, DANNA Graves       Assessment:  Present when checked    []  GCS  E   V  M     Heart Failure: []Acute, [] acute on chronic , []chronic  Heart Failure:  [] Diastolic (HFpEF), [] Systolic (HFrEF), []Combined (HFpEF and HFrEF), [] RHF, [] Pulm HTN, [] Other    [] DELMY, [] ATN, [] AIN, [] other  [] CKD1, [] CKD2, [] CKD 3, [] CKD 4, [] CKD 5, []ESRD    Encephalopathy: [] Metabolic, [] Hepatic, [] toxic, [] Neurological, [] Other    Abnormal Nurtitional Status: [] malnurtition (see nutrition note), [ ]underweight: BMI < 19, [] morbid obesity: BMI >40, [] Cachexia    [] Sepsis  [] hypovolemic shock,[] cardiogenic shock, [] hemorrhagic shock, [] neuogenic shock  [] Acute Respiratory Failure  []Cerebral edema, [] Brain compression/ herniation,   [] Functional quadriplegia  [] Acute blood loss anemia   NEUROSURGERY CONSUL NOTE:    ED Provider Note:  54 yo F with pmh of migraines, scoliosis, lung ca on chemo (1st treatment 9/15) with mets to liver and spine c/o mid to lower back pain since June. Pt on oxycodone which helps with her pain.  Pain worse when lying flat. Denies fever, chills. Pt states she had an MRI last week and Dr. Ruffin called her to tell her there was concern for the tumor "pressing on the spine." As per Dr. Ribera Pt has MRI of T/L spine which showed concern for cord compression at T2, T8. Pt denies fever, chills, n/v, dysuria, hematuria, abd pain, incontinence, saddle anesthesia, LE weakness.      Vital Signs Last 24 Hrs  T(C): 36.7 (29 Sep 2020 15:34), Max: 37.3 (29 Sep 2020 12:45)  T(F): 98 (29 Sep 2020 15:34), Max: 99.2 (29 Sep 2020 12:45)  HR: 104 (29 Sep 2020 15:34) (104 - 126)  BP: 123/78 (29 Sep 2020 15:34) (123/78 - 147/93)  BP(mean): --  RR: 18 (29 Sep 2020 15:34) (18 - 18)  SpO2: 99% (29 Sep 2020 15:34) (98% - 99%)    I&O's Detail    I&O's Summary      PHYSICAL EXAM:  Gen: Laying in stretcher. NAD  Exts: 2+ pulses throughout. Skin warm, dry.  Neuro: AAO x3. Full 5/5 strength uppers and lowers. SLIT. No hoffmans. No signs of myelopathy.     TUBES/LINES:  [] CVC  [] A-line  [] Lumbar Drain  [] Ventriculostomy  [] Other    DIET:  [] NPO  [] Mechanical  [] Tube feeds    LABS:                        7.2    7.35  )-----------( 317      ( 29 Sep 2020 13:52 )             25.0     09-29    136  |  96  |  7   ----------------------------<  110<H>  4.3   |  27  |  0.55    Ca    9.9      29 Sep 2020 13:52    TPro  7.5  /  Alb  4.2  /  TBili  0.2  /  DBili  x   /  AST  15  /  ALT  <5<L>  /  AlkPhos  766<H>  09-29    PT/INR - ( 29 Sep 2020 13:52 )   PT: 13.2 sec;   INR: 1.11          PTT - ( 29 Sep 2020 13:52 )  PTT:31.4 sec        CAPILLARY BLOOD GLUCOSE          Drug Levels: [] N/A    CSF Analysis: [] N/A      Allergies    No Known Allergies    Intolerances      MEDICATIONS:  Antibiotics:    Neuro:    Anticoagulation:    OTHER:    IVF:    CULTURES:    RADIOLOGY & ADDITIONAL TESTS:  MRI reviewed with Dr. D'Amico. MRI done 9/23 at [facility] Radiology         Family history of leukemia    FHx: throat cancer    Family hx of lung cancer    MEWS Score    HSV infection    Migraine    No pertinent past medical history    No pertinent past medical history    History of tonsillectomy    No significant past surgical history    No significant past surgical history    No significant past surgical history    BACK PAIN    37    [email or web address]        PLAN:  Recommendations will be made following patients Oncologist recommendation/report due to multiple metastases    Plan d/w Dr. D'Amico: Patient was evaluated by Dr. D'Amico in ED, and he reviewed the MRI of thoracic spine.  His recommendation as follow: CT scan of T- spine may be done now since patient is here or as out patient.  - F/U with Dr. D'Amico in 1 to 2 weeks  - Rt oncology consult and follow up.  - Patient has T3 and T8 stenosis which are asymptomatic; Due to multi level disease and  complexity of surgical procedure, radiation therapy is favored.  - Further plan per oncology / medicine  - Case discussed with Dr. D'Amico.        Assessment:  Present when checked    []  GCS  E   V  M     Heart Failure: []Acute, [] acute on chronic , []chronic  Heart Failure:  [] Diastolic (HFpEF), [] Systolic (HFrEF), []Combined (HFpEF and HFrEF), [] RHF, [] Pulm HTN, [] Other    [] DELMY, [] ATN, [] AIN, [] other  [] CKD1, [] CKD2, [] CKD 3, [] CKD 4, [] CKD 5, []ESRD    Encephalopathy: [] Metabolic, [] Hepatic, [] toxic, [] Neurological, [] Other    Abnormal Nurtitional Status: [] malnurtition (see nutrition note), [ ]underweight: BMI < 19, [] morbid obesity: BMI >40, [] Cachexia    [] Sepsis  [] hypovolemic shock,[] cardiogenic shock, [] hemorrhagic shock, [] neuogenic shock  [] Acute Respiratory Failure  []Cerebral edema, [] Brain compression/ herniation,   [] Functional quadriplegia  [] Acute blood loss anemia

## 2020-09-29 NOTE — CONSULT NOTE ADULT - SUBJECTIVE AND OBJECTIVE BOX
Hematology Oncology Consult Note   Discussed with Dr. Ruffin and recommendations reviewed with the primary team.    The patient was seen and examined    BHARAT SCHULTZ is a 55y Female with history of recently diagnosed metastatic lung adenocarcinoma on chemotherapy (last dose on 9/15) with Alimta/Pembro/Carboplatin presented to ED with worsening mid to low back pain, which has been ongoing for a few months, now getting worse. She has been taking oxycodone at home every 4-6 hours. She denies any focal weakness, bladder or bowel incontinence, numbness or tingling. She reports being constipated and now having loose stools. She had an MRI lumbar and thoracic spine which showed extensive metastatic disease throughout the lumbar and thoracic spine and concern for cord compression at T8 vertebrae. Oncology consulted for further management.     ROS is otherwise negative      PAST MEDICAL & SURGICAL HISTORY:  HSV infection  Migraine  History of tonsillectomy    Allergies: NKDA    Medications:  Home Medications:  Depakote  mg oral tablet, extended release: 1 tab(s) orally once a day (20 Aug 2020 23:53)  SUMAtriptan: 50 milligram(s) orally once a day (20 Aug 2020 23:53)  Valtrex: 500 milligram(s) orally once a day (20 Aug 2020 23:53)    MEDICATIONS  (STANDING):  oxycodone    5 mG/acetaminophen 325 mG 1 Tablet(s) Oral Once    Social history: Former smoker, no ETOH use     FAMILY HISTORY:  Father - lung cancer  Brother -leukemia     PHYSICAL EXAM:    T(F): 98 (09-29-20 @ 15:34), Max: 99.2 (09-29-20 @ 12:45)  HR: 104 (09-29-20 @ 15:34) (104 - 126)  BP: 123/78 (09-29-20 @ 15:34) (123/78 - 147/93)  RR: 18 (09-29-20 @ 15:34) (18 - 18)  SpO2: 99% (09-29-20 @ 15:34) (98% - 99%)    Gen: thin young female in moderate distress due to pain   HEENT: normocephalic/atraumatic, no conjunctival pallor, MMM  Neck: supple  Back: no spinal tenderness   Cardiovascular: RR, nl S1S2, no murmurs  Respiratory: clear air entry b/l  Gastrointestinal: BS+, soft, NT/ND  Extremities: no edema, no calf tenderness  Neurological: A&O x3, no focal deficits, strength 5/5 in UE and LE  Skin: no rash on visible skin  Musculoskeletal:  full ROM  Psychiatric:  mood stable      Labs:                          7.2    7.35  )-----------( 317      ( 29 Sep 2020 13:52 )             25.0     CBC Full  -  ( 29 Sep 2020 13:52 )  WBC Count : 7.35 K/uL  RBC Count : 2.79 M/uL  Hemoglobin : 7.2 g/dL  Hematocrit : 25.0 %  Platelet Count - Automated : 317 K/uL  Mean Cell Volume : 89.6 fl  Mean Cell Hemoglobin : 25.8 pg  Mean Cell Hemoglobin Concentration : 28.8 gm/dL  Auto Neutrophil # : 4.33 K/uL  Auto Lymphocyte # : 1.68 K/uL  Auto Monocyte # : 0.73 K/uL  Auto Eosinophil # : 0.47 K/uL  Auto Basophil # : 0.04 K/uL  Auto Neutrophil % : 58.9 %  Auto Lymphocyte % : 22.9 %  Auto Monocyte % : 9.9 %  Auto Eosinophil % : 6.4 %  Auto Basophil % : 0.5 %    PT/INR - ( 29 Sep 2020 13:52 )   PT: 13.2 sec;   INR: 1.11          PTT - ( 29 Sep 2020 13:52 )  PTT:31.4 sec    09-29    136  |  96  |  7   ----------------------------<  110<H>  4.3   |  27  |  0.55    Ca    9.9      29 Sep 2020 13:52    TPro  7.5  /  Alb  4.2  /  TBili  0.2  /  DBili  x   /  AST  15  /  ALT  <5<L>  /  AlkPhos  766<H>  09-29
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
## 2020-09-29 NOTE — ED PROVIDER NOTE - PHYSICAL EXAMINATION
CONSTITUTIONAL: Well-appearing; well-nourished; in no apparent distress.   HEAD: Normocephalic; atraumatic.   EYES: PERRL; EOM intact; conjunctiva and sclera clear  ENT: normal nose; no rhinorrhea; normal pharynx with no erythema or lesions.   NECK: Supple; non-tender; no LAD  CARDIOVASCULAR: Normal S1, S2; no murmurs, rubs, or gallops. Regular rate and rhythm.   RESPIRATORY: Breathing easily; breath sounds clear and equal bilaterally; no wheezes, rhonchi, or rales.  GI: Soft; non-distended; non-tender; no palpable organomegaly.   MSK: FROM at all extremities, normal tone   EXT: No cyanosis or edema; N/V intact  SKIN: Normal for age and race; warm; dry; good turgor; no apparent lesions or rash.   NEURO: A & O x 3; face symmetric; grossly unremarkable.   PSYCHOLOGICAL: The patient’s mood and manner are appropriate. CONSTITUTIONAL: Well-appearing; well-nourished; in no apparent distress.   HEAD: Normocephalic; atraumatic.   EYES: PERRL; EOM intact; conjunctiva and sclera clear  ENT: normal nose; no rhinorrhea; normal pharynx with no erythema or lesions.   NECK: Supple; non-tender; no LAD  CARDIOVASCULAR: Normal S1, S2; no murmurs, rubs, or gallops. Regular rate and rhythm.   RESPIRATORY: Breathing easily; breath sounds clear and equal bilaterally; no wheezes, rhonchi, or rales.  GI: Soft; non-distended; non-tender; no palpable organomegaly.   MSK: +tenderness to R upper, mid and lower back. LE strength 5/5 b/l.   EXT: No cyanosis or edema; N/V intact  SKIN: Normal for age and race; warm; dry; good turgor; no apparent lesions or rash.   NEURO: A & O x 3; face symmetric; grossly unremarkable.   PSYCHOLOGICAL: The patient’s mood and manner are appropriate.

## 2020-09-29 NOTE — ED PROVIDER NOTE - PROGRESS NOTE DETAILS
Director - pt pending NSG eval and recommendations.  Pt signed out to DANNA Rodriguez will cont to follow. Joseph: d/w neursurgery: recommends nonemergent non-contrast CT of thoracic spine, can be done as outpatient; to follow up with Dr D'Amico in 1-2 weeks. Patient requesting DC home, does not want to have the CT scan here and says she will see Dr Ruffin this Thursday, and can easily get an order for CT scan from him.  She will see Dr D'Amico in 1-2 weeks.  Discussed spinal cord compression symptoms and other indications for immediate return to ED with patient and her daughter; both expressed clear understanding with teach-back.

## 2020-09-29 NOTE — ED ADULT NURSE NOTE - OBJECTIVE STATEMENT
54 yo F pmhx lung CA currently on chemo presents to ED co back pain. Says pain has been occurring since june but was instructed to come in by oncologist for concern of tumor pressing on spinal cord per recent MRI. Pt is Aox3, speaking I full sentences, denies numbness, tingling, loss of control of bowels or bladder.

## 2020-09-29 NOTE — ED PROVIDER NOTE - PATIENT PORTAL LINK FT
You can access the FollowMyHealth Patient Portal offered by [facility] by registering at the following website: [email or web address]. By joining [employer]’s FollowMyHealth portal, you will also be able to view your health information using other applications (apps) compatible with our system.

## 2020-09-29 NOTE — CONSULT NOTE ADULT - ASSESSMENT
55 year old female with recently diagnosed stage IV lung adenocarcinoma with mets to supraclavicular and hilar lymph nodes, liver, diffuse osseous mets throughout axial skeleton, sternum, and pelvis, as well as pleural mets currently on chemotherapy admitted for worsening mid to low back pain, concern for cord compression on outpatient MRI thoracic and lumbar spine at the level of T8 vertebrae       Metastatic lung adenocarcinoma   - currently on chemotherapy with Carboplatin, Alimta, and pembrolizumab (last dose on 9/15)   - Outpatient MRI T- spine (9/15) showed extensive metastatic disease throughout the thoracic spine, pelvis and femurs with extension of metastatic disease into the spinal canal as well as multiple neural foramen, as described above. Abnormal hyperintense T2 signal within the spinal cord at the   level of T8 is consistent with edema versus myelomalacia.  - Outpatient MRI L-Spine (9/15) showed extensive metastatic disease throughout the lumbar spine with mild compression deformities at multiple lumbar levels and retropulsion of osseous material/metastatic disease into the anterior spinal canal at all lumbar levels. Note is made of moderate-severe central canal stenosis and there is encroachment on the left L5 nerve root in the left lateral recess as well as the exiting left L5 nerve root.    Plan:   -recommend neurosurgical consultation to rule out cord compression

## 2020-09-29 NOTE — ED PROVIDER NOTE - ATTENDING CONTRIBUTION TO CARE
56 yo female h/o migraines, scoliosis, lung ca on chemo (1st treatment 9/15) with mets to liver and spine c/o severe mid to lower back pain since June that improved w percocet.  Pt had outpt MRI - per Dr Ruffin, MRI shows compression T2, T8.  Pt denies incontinence, saddle anesthesia, numbness, weakness in ext.  No abd pain.  No sig change in back pain from prior.  Well appearing, nad, nc/at, lung cta, heart reg, abd soft, nt, ext no gross deformity, awake, alert, oriented x 3, CN II-XII grossly intact, motor 5/5, no gross sens deficits, gait steady, speech clear.  Pt w poss cord compression 2/2 bone mets but no sig sx of this on exam.  Plan nsg consult (images obtained and uploaded for their review).  Dispo per NSG.

## 2020-09-29 NOTE — ED PROVIDER NOTE - CLINICAL SUMMARY MEDICAL DECISION MAKING FREE TEXT BOX
56 yo F with pmh of migraines, scoliosis, lung ca on chemo (1st treatment 9/15) with mets to liver and spine c/o mid to lower back pain since June. Pt on oxycodone which helps with her pain.  Pain worse when lying flat. Denies fever, chills. Pt states she had an MRI last week and Dr. Ruffin called her to tell her there was concern for the tumor "pressing on the spine." As per Dr. Ribera Pt has MRI of T/L spine which showed concern for cord compression at T2, T8. No incontinence or saddle anesthesia. +tenderness to R upper, mid and lower back. LE strength 5/5 b/l.

## 2020-09-29 NOTE — ED ADULT TRIAGE NOTE - CHIEF COMPLAINT QUOTE
c/o back pain reported referred to go to the ED and quotes "my tumor may be pressing on my spinal cord." PHX lung ca on chemo. Denies paresthesia or bowel/bladder incontinence

## 2020-10-02 ENCOUNTER — OUTPATIENT (OUTPATIENT)
Dept: OUTPATIENT SERVICES | Facility: HOSPITAL | Age: 55
LOS: 1 days | End: 2020-10-02
Payer: COMMERCIAL

## 2020-10-02 ENCOUNTER — APPOINTMENT (OUTPATIENT)
Age: 55
End: 2020-10-02

## 2020-10-02 VITALS
OXYGEN SATURATION: 98 % | WEIGHT: 95.9 LBS | HEIGHT: 60 IN | HEART RATE: 94 BPM | RESPIRATION RATE: 17 BRPM | TEMPERATURE: 98 F | SYSTOLIC BLOOD PRESSURE: 124 MMHG | DIASTOLIC BLOOD PRESSURE: 82 MMHG

## 2020-10-02 VITALS
OXYGEN SATURATION: 99 % | RESPIRATION RATE: 17 BRPM | HEART RATE: 100 BPM | SYSTOLIC BLOOD PRESSURE: 145 MMHG | DIASTOLIC BLOOD PRESSURE: 85 MMHG | TEMPERATURE: 98 F

## 2020-10-02 DIAGNOSIS — Z90.89 ACQUIRED ABSENCE OF OTHER ORGANS: Chronic | ICD-10-CM

## 2020-10-02 DIAGNOSIS — D64.9 ANEMIA, UNSPECIFIED: ICD-10-CM

## 2020-10-02 PROCEDURE — 86850 RBC ANTIBODY SCREEN: CPT

## 2020-10-02 PROCEDURE — 36415 COLL VENOUS BLD VENIPUNCTURE: CPT

## 2020-10-02 PROCEDURE — 86901 BLOOD TYPING SEROLOGIC RH(D): CPT

## 2020-10-02 PROCEDURE — 86923 COMPATIBILITY TEST ELECTRIC: CPT

## 2020-10-02 PROCEDURE — P9040: CPT

## 2020-10-02 PROCEDURE — 86900 BLOOD TYPING SEROLOGIC ABO: CPT

## 2020-10-02 PROCEDURE — 36430 TRANSFUSION BLD/BLD COMPNT: CPT

## 2020-10-02 RX ORDER — DIPHENHYDRAMINE HCL 50 MG
25 CAPSULE ORAL ONCE
Refills: 0 | Status: COMPLETED | OUTPATIENT
Start: 2020-10-02 | End: 2020-10-02

## 2020-10-02 RX ORDER — ACETAMINOPHEN 500 MG
650 TABLET ORAL ONCE
Refills: 0 | Status: COMPLETED | OUTPATIENT
Start: 2020-10-02 | End: 2020-10-02

## 2020-10-02 RX ADMIN — Medication 650 MILLIGRAM(S): at 12:34

## 2020-10-02 RX ADMIN — Medication 25 MILLIGRAM(S): at 12:34

## 2020-10-08 ENCOUNTER — APPOINTMENT (OUTPATIENT)
Dept: INTERVENTIONAL RADIOLOGY/VASCULAR | Facility: HOSPITAL | Age: 55
End: 2020-10-08

## 2020-10-12 ENCOUNTER — APPOINTMENT (OUTPATIENT)
Dept: RADIATION ONCOLOGY | Facility: CLINIC | Age: 55
End: 2020-10-12
Payer: COMMERCIAL

## 2020-10-12 DIAGNOSIS — Z80.6 FAMILY HISTORY OF LEUKEMIA: ICD-10-CM

## 2020-10-12 DIAGNOSIS — Z87.39 PERSONAL HISTORY OF OTHER DISEASES OF THE MUSCULOSKELETAL SYSTEM AND CONNECTIVE TISSUE: ICD-10-CM

## 2020-10-12 DIAGNOSIS — C79.51 SECONDARY MALIGNANT NEOPLASM OF BONE: ICD-10-CM

## 2020-10-12 DIAGNOSIS — G43.909 MIGRAINE, UNSPECIFIED, NOT INTRACTABLE, W/OUT STATUS MIGRAINOSUS: ICD-10-CM

## 2020-10-12 DIAGNOSIS — Z80.1 FAMILY HISTORY OF MALIGNANT NEOPLASM OF TRACHEA, BRONCHUS AND LUNG: ICD-10-CM

## 2020-10-12 DIAGNOSIS — Z78.9 OTHER SPECIFIED HEALTH STATUS: ICD-10-CM

## 2020-10-12 DIAGNOSIS — Z87.891 PERSONAL HISTORY OF NICOTINE DEPENDENCE: ICD-10-CM

## 2020-10-12 DIAGNOSIS — Z80.0 FAMILY HISTORY OF MALIGNANT NEOPLASM OF DIGESTIVE ORGANS: ICD-10-CM

## 2020-10-12 PROCEDURE — 99205 OFFICE O/P NEW HI 60 MIN: CPT

## 2020-10-12 RX ORDER — ONDANSETRON 4 MG/1
4 TABLET ORAL
Qty: 9 | Refills: 0 | Status: ACTIVE | COMMUNITY
Start: 2020-10-01

## 2020-10-12 RX ORDER — FOLIC ACID 1 MG/1
1 TABLET ORAL
Qty: 90 | Refills: 0 | Status: ACTIVE | COMMUNITY
Start: 2020-08-27

## 2020-10-12 RX ORDER — METRONIDAZOLE 7.5 MG/G
0.75 GEL VAGINAL
Qty: 70 | Refills: 0 | Status: COMPLETED | COMMUNITY
Start: 2017-09-11 | End: 2020-10-12

## 2020-10-12 RX ORDER — LACTULOSE 10 G/15ML
10 SOLUTION ORAL
Qty: 240 | Refills: 0 | Status: ACTIVE | COMMUNITY
Start: 2020-10-01

## 2020-10-12 RX ORDER — CLINDAMYCIN PHOSPHATE 1 G/10ML
1 GEL TOPICAL
Qty: 60 | Refills: 0 | Status: DISCONTINUED | COMMUNITY
Start: 2017-08-22 | End: 2020-10-12

## 2020-10-12 RX ORDER — TOLTERODINE TARTRATE 2 MG/1
2 CAPSULE, EXTENDED RELEASE ORAL
Qty: 30 | Refills: 0 | Status: DISCONTINUED | COMMUNITY
Start: 2017-05-19 | End: 2020-10-12

## 2020-10-12 RX ORDER — OXYBUTYNIN CHLORIDE 5 MG/1
5 TABLET, EXTENDED RELEASE ORAL
Qty: 90 | Refills: 2 | Status: DISCONTINUED | COMMUNITY
Start: 2018-02-20 | End: 2020-10-12

## 2020-10-12 NOTE — REASON FOR VISIT
[Consideration for Non-Curative Therapy] : consideration for non-curative therapy for [Bone Metastasis] : bone metastasis [Other: _____] : [unfilled]

## 2020-10-13 NOTE — PHYSICAL EXAM
[General Appearance - Well Developed] : well developed [General Appearance - Alert] : alert [General Appearance - In No Acute Distress] : in no acute distress [Thin] : thin [Heart Rate And Rhythm] : heart rate and rhythm were normal [Heart Sounds] : normal S1 and S2 [Bowel Sounds] : normal bowel sounds [Abdomen Soft] : soft [Nondistended] : nondistended [Abdomen Tenderness] : non-tender [Cervical Lymph Nodes Enlarged Posterior Bilaterally] : posterior cervical [Cervical Lymph Nodes Enlarged Anterior Bilaterally] : anterior cervical [Supraclavicular Lymph Nodes Enlarged Bilaterally] : supraclavicular [Axillary Lymph Nodes Enlarged Bilaterally] : axillary [Motor Tone] : muscle strength and tone were normal [No Spine Tenderness] : no tenderness to palpation of the vertebral spine [Normal] : oriented to person, place and time, the affect was normal, the mood was normal and not anxious [de-identified] : +systolic murmur. [de-identified] : No tenderness upon palpation at this time.  [de-identified] : tenderness right upper back medial to right scapula

## 2020-10-13 NOTE — REVIEW OF SYSTEMS
[Recent Change In Weight] : ~T recent weight change [Constipation] : constipation [Negative] : Allergic/Immunologic [FreeTextEntry2] : 11 pound weight loss in 3 months.  [FreeTextEntry9] : +back pain.

## 2020-10-13 NOTE — VITALS
[Maximal Pain Intensity: 10/10] : 10/10 [Least Pain Intensity: 2/10] : 2/10 [Pain Description/Quality: ___] : Pain description/quality: [unfilled] [Pain Duration: ___] : Pain duration: [unfilled] [Pain Location: ___] : Pain Location: [unfilled] [Opioid] : opioid [ECOG Performance Status: 2 - Ambulatory and capable of all self care but unable to carry out any work activities] : Performance Status: 2 - Ambulatory and capable of all self care but unable to carry out any work activities. Up and about more than 50% of waking hours [Date: ____________] : Patient's last distress assessment performed on [unfilled]. [1 - Distress Level] : Distress Level: 1 [Referred Patient  to social work for follow-up] : Patient was referred to social work for follow-up [70: Cares for self; unalbe to carry on normal activity or do active work.] : 70: Cares for self; unable to carry on normal activity or do active work. [FreeTextEntry7] : Patient would like to speak with SW regarding transportation.

## 2020-10-13 NOTE — HISTORY OF PRESENT ILLNESS
[FreeTextEntry1] : Ms. Halima Zapien is a 55 year-old female former smoker referred by Dr. 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  She presented to her PCP, Dr. Joel Townsend, in August 2020 with a palpable nodule in her left neck with unintentional weight loss.  She also noted right subscapular pain in February 2020, and it resolved, then returned in June 2020. She was referred to Dr. Ruffin and underwent CT neck, which reportedly showed possible PE and concern for metastasis.  She was referred to the ED on 8/20/20 and underwent further workup as follows:\par \par 8/20/2020, CTA CAP \par -Left lung mass consistent with malignancy suspicious for primary which broadly contacts left major fissure and also pericardium. \par -Left supraclavicular and right hilar adenopathy consistent with metastasis. \par - Diffuse osseous metastases throughout the axial skeleton, sternum, and pelvis with multilevel compression deformities suspected pathologic bases as detailed above. \par -Suspected hepatic and right pleural metastases. \par -Punctate noncalcified subpleural nodule medial right middle lobe 0.2 cm, indeterminate, possibly metastasis. \par \par 8/21/2020, PET/CT \par -FDG avid left lung mass with a right hilar lymph node metastasis, at least five liver metastases, and diffuse skeletal osseous metastases. \par -There is a pleural metastasis overlying an abnormal right posterior 7th rib. \par -There are also probable metastases to the anterior mediastinum and supraclavicular lymph nodes. \par \par 8/21/2020, Left lobe liver mass, core biopsy:\par -Metastatic adenocarcinoma, consistent with lung origin.\par \par 8/22/2020, MRI brain \par -Osseous metastatic lesions involving the left frontal bone of the calvarium extending to the left orbital roof and additional lesion in the right posterior temporal-occipital calvarium. CT had could be obtained to assess for cortical disruption if indicated clinically. \par -No brain parenchymal mass identified. However, contrast-enhanced MR imaging is more sensitive for the detection of parenchymal metastasis and is recommended when the patient is able. \par -Chronic microangiopathic disease. No acute infarct. \par \par She started systemic therapy on 9/15/2020 with Alimta/Pembro/Carboplatin under the care of Dr. Ruffin.  She developed mid to low back pain.  \par \par 9/24/2020, MRI thoracic spine ([facility])-\par -Extensive metastatic disease throughout the thoracic spine, pelvis and femurs with extension of metastatic disease into the spinal canal as well as multiple neural foramen. Abnormal hyperintense T2 signal within the spinal cord at the level of T8 is consistent with edema versus myelomalacia.\par -Left lung mass on  images. Thin, heterogeneous abnormal pleural signal at multiple thoracic levels \par \par 9/24/2020, MRI lumbar spine ([facility])-\par -Extensive metastatic disease throughout the lumbar spine with mild compression deformities at multiple lumbar levels and retropulsion of osseous material/ metastatic disease into the anterior spinal canal at all lumbar levels. Note is made of moderate-severe central canal stenosis and there is encroachment on the left L5 nerve root in the left lateral recess as well as the exiting left L5 nerve root.\par -Nonspecific distention of the gallbladder; a dedicated right upper quadrant ultrasound could be obtained for further evaluation as clinically warranted.\par \par She presented to the ED on 9/29/20 with worsening back pain and concern for cord compression on MRI.  She was seen by Dr. D'Amico of neurosurgery who recommended CT of the thoracic spine and radiation consult.  She declined to have CT done in favor of outpatient follow-up and was discharged.  She last saw Dr. Ruffin last week; plan is to start Erlotinib. She had been scheduled for second cycle of chemotherapy on 10/6/2020, but [facility] testing confirmed EGFR positivity. She is scheduled for follow-up with Dr. D'Amico on 10/14/20.  \par \par Today, she reports back pain, right subscapular, currently rated 5/ 10, but can reach 10/10. Pain is aching in quality. She also has lower back (across lower back), but states she has history of scoliosis and has suffered from chronic lower back pain. She takes Oxycodone 10 mg twice daily, as well as Percocet (5/325) every 6 hours. She states the pain meds are somewhat helpful for pain. She states activity and moving around make the pain worse and thus her normal activity is limited. She also reports opioid- induced constipation, which resolved after using laxative. She further notes weight loss of 11 pounds since July 2020. She denies further complaints, to include CP, SOB, fever, chills, weakness, bowel/ bladder dysfunction, dizziness, numbness, tingling. \par \par LMP- 5 years ago. \par \par [place]. She lives alone, but her daughter has been staying with her since she was diagnosed. 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 She gets to medical appointments via Uber/ taxi. She is a former smoker, smoked 1/4 ppd x 40 years, quit this year. Denies alcohol or illicit drug use. \par \par Family history is notable for father with throat cancer and lung cancer (smoker), uncle and brother with leukemia.

## 2020-10-14 ENCOUNTER — APPOINTMENT (OUTPATIENT)
Dept: NEUROSURGERY | Facility: CLINIC | Age: 55
End: 2020-10-14

## 2020-10-17 NOTE — ED ADULT NURSE NOTE - CAS ELECT INFOMATION PROVIDED
Alert-The patient is alert, awake and responds to voice. The patient is oriented to time, place, and person. The triage nurse is able to obtain subjective information. DC instructions

## 2020-10-20 NOTE — HISTORY OF PRESENT ILLNESS
[de-identified] : 56 yo F with Phx of migraines, scoliosis, Lung Ca on chemo (1st treatment 9/15) with mets to liver and spine who presented to our ED on 9/29/2020 with c/o mid to lower back pain since June. Pt on oxycodone which helps with her pain, worse when lying flat. Pt states she had an MRI the week prior and Dr. Ruffin called her to tell her there was concern for the tumor "pressing on the spine." As per Dr. Ruffin, Pt has MRI of T/L spine which showed concern for cord compression at T2, T8. Image workup also noted multiple osseous metastatic lesions in her brain. She is currently following with Dr. Owens

## 2020-10-20 NOTE — DATA REVIEWED
[de-identified] : \par Date of Exam: 08-\par  \par \par  \par  \par   \par   \par   \par \par \par \par  \par EXAM:  X-RAY LUMBAR SPINE MINIMUM 4 VIEWS\par \par HISTORY:  Low back pain. \par \par TECHNIQUE:  5 (including flexion and extension) views.\par \par COMPARISON:  No prior radiographs available for review. \par \par FINDINGS:\par There is a thoracolumbar levoscoliosis demonstrating a Chaudhry angle of 33 degrees.\par Vertebral bodies normal in height. No evidence of fracture. Sagittal alignment maintained without dynamic instability. No lytic or destructive lesions.\par Asymmetric disc space narrowing noted L1-2 through L4-5 most pronounced L4-5 as seen on AP views. There is multilevel osteophytic spurring.\par The sacroiliac joints are unremarkable.\par \par \par IMPRESSION:  \par Levoscoliosis and degenerative changes.\par \par \par \par \par  \par \par \par  \par Thank you for the opportunity to participate in the care of this patient.  \par \par \par  \par  \par  \par  \par  [de-identified] : EXAM:  MR BRAIN                      \par \par PROCEDURE DATE:  08/22/2020  \par \par \par \par INTERPRETATION:  BRAIN MR WITHOUT CONTRAST\par \par INDICATION: Patient with extensive metastatic disease. Rule out intracranial metastasis.\par \par TECHNIQUE: Sagittal T1, axial T1, FLAIR, gradient echo, and diffusion-weighted imaging of the brain was obtained along with coronal FLAIR. No contrast was given, as patient declined further imaging.\par \par FINDINGS:\par On the sagittal sequence, there appears to be marrow replacement within C2, C3, and C5 vertebral bodies suggestive of metastatic disease. Cervical spine MR with contrast may be helpful for further evaluation.\par \par There is a large area of abnormal signal involving the left frontal calvarium extending toward the left orbital roof demonstrating marrow replacement with abnormal T2 prolongation and abnormal diffusion signal consistent with an area of osseous metastatic disease. There is subjacent abnormal signal in the extra-axial compartment that is thin and linear that may represent pachymeningeal/dural thickening related to osseous metastatic disease. There is an additional lesion demonstrating similar signal characteristics involving the right posterior temporal-occipital calvarium with similar subjacent abnormal extra-axial signal likely pachymeningeal/dural thickening system of osseous metastasis. CT brain could be obtained to look for cortical disruption.\par \par There is age-appropriate parenchymal volume loss. There is nonspecific T2 prolongation within the periventricular white matter and a couple of nonspecific foci in the frontal and parietal subcortical white matter consistent with chronic microangiopathic disease. No parenchymal mass is seen. There is no midline shift. There is no acute infarct on the diffusion-weighted imaging. No abnormal susceptibility foci are seen in the brain parenchyma gradient echo. No air-fluid levels are seen in the paranasal sinuses. There is minimal opacification within the right mastoid air cells inferiorly.\par \par IMPRESSION:\par \par 1. Osseous metastatic lesions involving the left frontal bone of the calvarium extending to the left orbital roof and additional lesion in the right posterior temporal-occipital calvarium. CT had could be obtained to assess for cortical disruption if indicated clinically.\par 2. No brain parenchymal mass identified. However, contrast-enhanced MR imaging is more sensitive for the detection of parenchymal metastasis and is recommended when the patient is able.\par 3. Chronic microangiopathic disease. No acute infarct.\par \par \par \par \par Thank you for the opportunity to participate in the care of this patient.\par \par \par \par HAILEY DURANT M.D., ATTENDING RADIOLOGIST\par This document has been electronically signed. Aug 22 2020  2:08PM\par   \par \par   \par \par \par Date of Exam: 09-\par  \par \par  \par  \par   \par   \par   \par \par \par \par  \par EXAM:  MRI THORACIC SPINE WITHOUT CONTRAST\par \par HISTORY:  Back pain with history of lung adenocarcinoma.\par \par TECHNIQUE: An MRI examination of the thoracic spine was performed utilizing multiplanar sequences. \par \par COMPARISON:  No prior study is available for comparison.\par \par FINDINGS: \par \par There is replacement of the normal marrow signal with heterogeneously hyperintense STIR signal at almost all thoracic levels and involving multiple lateral and posterior elements, findings consistent with metastatic disease.\par \par There are moderate compression deformities involving the T7 and T8 vertebral bodies. There is retropulsion of osseous material into the anterior epidural space as well as additional soft tissue signal intensity most consistent with metastatic disease, resulting in mild central canal stenosis at T7 and moderate-severe central canal stenosis at T8. A small amount of hyperintense T2 signal within the spinal cord at the level of T8 is consistent with edema versus myelomalacia. There is mass effect on the spinal cord at the level of T8. \par \par Metastatic disease extends into the T7/T8 and T8/T9 neural foramen, resulting in severe neural foraminal narrowing at these levels. Signal intensity involving the left T6/T7 neural foramen and resulting in mild left neural foraminal narrowing may represent metastatic disease.\par \par There are mild compression deformities involving the T3, T4 and T12 vertebral bodies with abnormal signal intensity within the spinal canal at the level of T3 consistent with metastatic disease and resulting in moderate-severe central canal stenosis and mass effect on the spinal cord at this level. There is mild retropulsion of osseous material into the anterior epidural space at the level of T4 which contacts the ventral spinal cord however there is no central canal stenosis. There is moderate right paracentral retropulsion of osseous material into the anterior epidural space at the level of T12, resulting in severe narrowing of the right lateral recess at this level. There is no abnormal spinal cord signal at these levels.\par \par Metastatic disease at T3 and T4 extends into the right T3/T4 and right T4/T5 neural foramen, respectively, resulting in severe right neural foraminal narrowing at these levels.\par \par There is abnormal signal intensity involving the left dorsolateral aspect of the central canal at the level of T5 consistent with metastatic disease which contacts the left lateral aspect of the spinal cord however there is no abnormal spinal cord signal. There is moderate central canal stenosis at this level. The metastatic disease extends into the left T5/T6 neural foramen, resulting in mild left neural foraminal narrowing at this level.\par \par There is a small amount of abnormal signal intensity involving the left paracentral aspect of the spinal canal the level of T6 which contacts the ventral spinal cord however there is no abnormal spinal cord signal nor central canal stenosis at this level.\par \par There is mild dextroscoliosis. There is mild intervertebral disc space narrowing at multiple thoracic levels.\par \par  images demonstrate marrow replacement/destructive changes of the right acetabulum and visualized femurs as well as multiple additional regions of the iliac bones.  images also demonstrate a left lung mass. In addition, on axial T2-weighted images, there is thin, heterogeneous, abnormal pleural signal at multiple thoracic levels.\par \par IMPRESSION:\par \par Extensive metastatic disease throughout the thoracic spine, pelvis and femurs with extension of metastatic disease into the spinal canal as well as multiple neural foramen, as described above. Abnormal hyperintense T2 signal within the spinal cord at the level of T8 is consistent with edema versus myelomalacia.\par \par Left lung mass on  images. Thin, heterogeneous abnormal pleural signal at multiple thoracic levels.\par \par Findings were discussed with Dr. Ruffin at 4:27 PM on 9/24/2020.\par \par \par \par \par \par \par  \par \par \par  \par Thank you for the opportunity to participate in the care of this patient.  \par  \par KAYY DELEON MD  - Electronically Signed: 09- 4:33 PM \par Physician to Physician Direct Line is: [phone]\par \par \par  \par  \par  \par  \par \par \par Date of Exam: 09-\par  \par \par  \par  \par   \par   \par   \par \par \par \par  \par EXAM:  MRI LUMBAR SPINE WITHOUT CONTRAST\par \par HISTORY:  Back pain with history of lung adenocarcinoma.\par \par TECHNIQUE: An MRI examination of the lumbar spine was performed utilizing multiplanar sequences.\par \par COMPARISON:  Comparison is made to the x-ray of the lumbar spine performed on 8/14/2020.\par \par FINDINGS: \par \par There is replacement of normal marrow signal throughout the lumbar spine consistent with metastatic disease. There are mild compression deformities at multiple lumbar levels with retropulsion of osseous material/metastatic disease into the anterior spinal canal at all lumbar levels. Extension of disease into the central canal results in moderate-severe central canal stenosis at L5 with encroachment on the left L5 nerve root in the left lateral recess however otherwise, there is no significant central canal stenosis.\par \par There is extension of metastatic disease from the level of L5 into the left L5/S1 neural foramen, resulting in encroachment on the exiting left L5 nerve root.\par \par There is moderate levoscoliosis. There is intervertebral disc space narrowing at multiple lumbar levels. Incidental note is made of prominent sacral epidural fat.\par \par There is nonspecific distention of the gallbladder.\par \par IMPRESSION:\par \par Extensive metastatic disease throughout the lumbar spine with mild compression deformities at multiple lumbar levels and retropulsion of osseous material/metastatic disease into the anterior spinal canal at all lumbar levels. Note is made of moderate-severe central canal stenosis and there is encroachment on the left L5 nerve root in the left lateral recess as well as the exiting left L5 nerve root.\par \par Nonspecific distention of the gallbladder; a dedicated right upper quadrant ultrasound could be obtained for further evaluation as clinically warranted.\par \par Findings were discussed with Dr. Ruffin at 4:27 PM on 9/24/2020.\par \par \par \par \par  \par \par \par  \par Thank you for the opportunity to participate in the care of this patient.  \par  \par KAYY DELEON MD  - Electronically Signed: 09- 4:47 PM \par Physician to Physician Direct Line is: [phone]\par \par \par  \par  \par  \par  \par \par Date of Exam: 08-\par  \par \par  \par  \par   \par   \par   \par \par \par \par  \par EXAM:  CT ANGIOGRAPHY CHEST - PE PROTOCOL\par \par HISTORY:  Shortness of breath and tachycardia. Follow-up to CT 8/19/2020. Known diagnosis of primary lung cancer (adenocarcinoma) after recent biopsy.\par \par \par TECHNIQUE:  CT pulmonary angiography examination of the chest was performed following intravenous administration of 100 cc of Optiray 350 iodinated contrast material (from a 100 cc vial). CTA examination technique is performed with pulmonary phase of contrast-enhancement. 3-D reconstruction with thin MIP axial and MPR coronal imaging is provided to evaluate for pulmonary embolism. One or more of the following dose reduction techniques were used: automated exposure control, adjustment of the mA and/or kV according to patient size, use of iterative reconstruction technique. \par \par Exam quality: Overall exam quality is satisfactory. Pulmonary arterial enhancement is adequate, the breath-hold is adequate, and there are no significant artifacts impacting image quality. (i.e. noise, beam hardening artifact, streak artifact, patient body habitus.)\par \par Note - This patient has received 1 prior CT study and 0 prior myocardial perfusion studies within our network over the previous 12 month period.\par \par COMPARISON:  CT neck 8/19/2020.\par \par FINDINGS:\par \par Pulmonary arterial tree: No central or segmental pulmonary arterial filling defects to suggest pulmonary embolus. No right ventricular enlargement or evidence of right heart strain.\par \par Lungs, airways, pleura: There is an enhancing mass with spiculated margins in the left upper lobe, situated along the oblique fissure. The mass measures approximately 3.0 x 2.9 x 2.9 cm (craniocaudad, transverse, AP dimensions) and is presumed to be concordant with the recently biopsied adenocarcinoma. Additional small tubular-shaped density tracking inferiorly from the mass likely reflects an area of mucus plugging.\par No pleural effusion.\par No additional pulmonary mass or consolidation.\par There is an area of pleural thickening along the posterior aspect of the right lower lobe (series 10, images 49-58), most likely a pleural metastasis.\par No pneumothorax.\par Central airways are patent.\par \par Mediastinum/joe: There is bilateral hilar lymphadenopathy.\par \par Heart and great vessels: Heart is within normal limits of size. Thoracic aorta is normal in caliber. Great vessels appear normal in course and caliber.\par \par Axilla: No axillary lymphadenopathy.\par \par Soft tissues: The patient is cachectic. Visualized subcutaneous fat planes are otherwise unremarkable. Visualized thyroid is normal in attenuation.\par \par Upper abdomen: Visualized portion of the upper abdomen is unremarkable.\par \par Osseous structures: There are age-indeterminate compression fracture deformities of the T4, T5, T8, and T9 vertebral bodies: There is approximately 40% loss of central vertebral body height at T8 and T9, and 10-20% loss of vertebral body height at T4 and T5.\par In addition, there is abnormal mixed sclerosis and lucency throughout the T4-T9 bodies, as well as the L1-L2 vertebral bodies, concerning for intraosseous metastases in this setting.\par \par IMPRESSION: CT pulmonary angiography of the chest demonstrates: \par \par 1.  No evidence of pulmonary embolism.\par 2.  Large enhancing spiculated 3.0 x 2.9 x 2.9 cm mass within the left upper lobe, concordant with known history of pulmonary adenocarcinoma. Additional small adjacent tubular-shaped densities inferior to the mass likely reflect areas of mucus plugging.\par 3.  Pleural thickening along the posterior aspect of the right lower lobe, most likely pleural metastasis in the setting.\par 4.  Bilateral hilar lymphadenopathy.\par 5.  Mixed sclerosis and lucency throughout the T4-T9 bodies as well as the L1 and L2 vertebral bodies, concerning for osseous metastases in this setting. Age-indeterminate compression fracture deformities of the T4 and T5 vertebral bodies (approximately 10-20% loss of vertebral body height) and and T8 and T9 vertebral bodies (approximately 40% loss of vertebral body height, likely pathologic.\par \par \par \par  \par \par Findings were communicated to Dr. Ruffin by telephone conversation on 8/25/2020 4:54 PM. \par \par \par \par \par  \par \par \par  \par Thank you for the opportunity to participate in the care of this patient.  \par  \par Rula Bejarano MD  - Electronically Signed: 08- 4:55 PM \par Physician to Physician Direct Line is: [phone]\par \par \par  \par  \par   \par   \par   \par

## 2020-10-21 ENCOUNTER — APPOINTMENT (OUTPATIENT)
Dept: NEUROSURGERY | Facility: CLINIC | Age: 55
End: 2020-10-21

## 2020-10-26 NOTE — REASON FOR VISIT
[Routine On-Treatment] : a routine on-treatment visit for [Bone Metastasis] : bone metastasis [Other: _____] : [unfilled]

## 2020-10-27 ENCOUNTER — NON-APPOINTMENT (OUTPATIENT)
Age: 55
End: 2020-10-27

## 2020-10-27 ENCOUNTER — RX RENEWAL (OUTPATIENT)
Age: 55
End: 2020-10-27

## 2020-10-27 RX ORDER — OMEPRAZOLE 20 MG/1
20 CAPSULE, DELAYED RELEASE ORAL
Qty: 30 | Refills: 0 | Status: DISCONTINUED | COMMUNITY
Start: 2020-10-27 | End: 2020-10-27

## 2020-10-30 ENCOUNTER — NON-APPOINTMENT (OUTPATIENT)
Age: 55
End: 2020-10-30

## 2020-10-30 RX ORDER — OXYCODONE AND ACETAMINOPHEN 5; 325 MG/1; MG/1
5-325 TABLET ORAL EVERY 6 HOURS
Qty: 112 | Refills: 0 | Status: ACTIVE | COMMUNITY
Start: 2020-10-19 | End: 1900-01-01

## 2020-11-02 NOTE — DISEASE MANAGEMENT
[Clinical] : TNM Stage: c [IV] : IV [TTNM] : - [NTNM] : - [MTNM] : 1 [de-identified] : 1600 cGy [de-identified] : 2000 cGy [de-identified] : T2-T8 rib, 0 cGy / 2000 cGy lumbar spine

## 2020-11-02 NOTE — HISTORY OF PRESENT ILLNESS
[FreeTextEntry1] : Ms. Halima Zapien is a 55 year old woman with a diagnosis of metastatic lung cancer to multiple bone metastases, in particular two painful regions in the right upper back just medial to scapula (in the area of right pleural metastasis and spinal mets) and the lower lumbar spine, including the area of the right SI joint. \par \par 10/27/20 - OTV - Ms. Zapien has completed 1600 cGy / 2000 cGy to T2-T8, rib and 0 cGy / 2000 cGy to the lumbar spine.  Today, she reports 10/10 pain for the past few days.  She states she ran out of Oxycontin and had trouble obtaining it from the pharmacy, which she feels contributed to worsening pain.  She has been taking Percocet q4h PRN and dexamethasone.  She is taking bowel regimen Rx'd by Dr. Ruffin.  She denies numbness, weakness, paresthesias.  She is using shea butter for skin care.  \par ________________________________\par ONCOLOGIC HISTORY \par Ms. Halima Zapien is a 55 year-old female former smoker referred by Dr. 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  She presented to her PCP, Dr. Joel Townsend, in August 2020 with a palpable nodule in her left neck with unintentional weight loss.  She also noted right subscapular pain in February 2020, and it resolved, then returned in June 2020. She was referred to Dr. Ruffin and underwent CT neck, which reportedly showed possible PE and concern for metastasis.  She was referred to the ED on 8/20/20 and underwent further workup as follows:\par \par 8/20/2020, CTA CAP \par -Left lung mass consistent with malignancy suspicious for primary which broadly contacts left major fissure and also pericardium. \par -Left supraclavicular and right hilar adenopathy consistent with metastasis. \par - Diffuse osseous metastases throughout the axial skeleton, sternum, and pelvis with multilevel compression deformities suspected pathologic bases as detailed above. \par -Suspected hepatic and right pleural metastases. \par -Punctate noncalcified subpleural nodule medial right middle lobe 0.2 cm, indeterminate, possibly metastasis. \par \par 8/21/2020, PET/CT \par -FDG avid left lung mass with a right hilar lymph node metastasis, at least five liver metastases, and diffuse skeletal osseous metastases. \par -There is a pleural metastasis overlying an abnormal right posterior 7th rib. \par -There are also probable metastases to the anterior mediastinum and supraclavicular lymph nodes. \par \par 8/21/2020, Left lobe liver mass, core biopsy:\par -Metastatic adenocarcinoma, consistent with lung origin.\par \par 8/22/2020, MRI brain \par -Osseous metastatic lesions involving the left frontal bone of the calvarium extending to the left orbital roof and additional lesion in the right posterior temporal-occipital calvarium. CT had could be obtained to assess for cortical disruption if indicated clinically. \par -No brain parenchymal mass identified. However, contrast-enhanced MR imaging is more sensitive for the detection of parenchymal metastasis and is recommended when the patient is able. \par -Chronic microangiopathic disease. No acute infarct. \par \par She started systemic therapy on 9/15/2020 with Alimta/Pembro/Carboplatin under the care of Dr. Ruffin.  She developed mid to low back pain.  \par \par 9/24/2020, MRI thoracic spine ([facility])-\par -Extensive metastatic disease throughout the thoracic spine, pelvis and femurs with extension of metastatic disease into the spinal canal as well as multiple neural foramen. Abnormal hyperintense T2 signal within the spinal cord at the level of T8 is consistent with edema versus myelomalacia.\par -Left lung mass on  images. Thin, heterogeneous abnormal pleural signal at multiple thoracic levels \par \par 9/24/2020, MRI lumbar spine ([facility])-\par -Extensive metastatic disease throughout the lumbar spine with mild compression deformities at multiple lumbar levels and retropulsion of osseous material/ metastatic disease into the anterior spinal canal at all lumbar levels. Note is made of moderate-severe central canal stenosis and there is encroachment on the left L5 nerve root in the left lateral recess as well as the exiting left L5 nerve root.\par -Nonspecific distention of the gallbladder; a dedicated right upper quadrant ultrasound could be obtained for further evaluation as clinically warranted.\par \par She presented to the ED on 9/29/20 with worsening back pain and concern for cord compression on MRI.  She was seen by Dr. D'Amico of neurosurgery who recommended CT of the thoracic spine and radiation consult.  She declined to have CT done in favor of outpatient follow-up and was discharged.  She last saw Dr. Ruffin last week; plan is to start Erlotinib. She had been scheduled for second cycle of chemotherapy on 10/6/2020, but CARIS testing confirmed EGFR positivity. She is scheduled for follow-up with Dr. D'Amico on 10/14/20.  \par \par Today, she reports back pain, right subscapular, currently rated 5/ 10, but can reach 10/10. Pain is aching in quality. She also has lower back (across lower back), but states she has history of scoliosis and has suffered from chronic lower back pain. She takes Oxycodone 10 mg twice daily, as well as Percocet (5/325) every 6 hours. She states the pain meds are somewhat helpful for pain. She states activity and moving around make the pain worse and thus her normal activity is limited. She also reports opioid- induced constipation, which resolved after using laxative. She further notes weight loss of 11 pounds since July 2020. She denies further complaints, to include CP, SOB, fever, chills, weakness, bowel/ bladder dysfunction, dizziness, numbness, tingling. \par \par LMP- 5 years ago. \par \par [place]. She lives alone, but her daughter has been staying with her since she was diagnosed. 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 She gets to medical appointments via Uber/ taxi. She is a former smoker, smoked 1/4 ppd x 40 years, quit this year. Denies alcohol or illicit drug use. \par \par Family history is notable for father with throat cancer and lung cancer (smoker), uncle and brother with leukemia.

## 2020-11-02 NOTE — VITALS
[Maximal Pain Intensity: 10/10] : 10/10 [Least Pain Intensity: 2/10] : 2/10 [Pain Description/Quality: ___] : Pain description/quality: [unfilled] [Pain Duration: ___] : Pain duration: [unfilled] [Pain Location: ___] : Pain Location: [unfilled] [Opioid] : opioid [70: Cares for self; unalbe to carry on normal activity or do active work.] : 70: Cares for self; unable to carry on normal activity or do active work. [ECOG Performance Status: 2 - Ambulatory and capable of all self care but unable to carry out any work activities] : Performance Status: 2 - Ambulatory and capable of all self care but unable to carry out any work activities. Up and about more than 50% of waking hours [Date: ____________] : Patient's last distress assessment performed on [unfilled]. [1 - Distress Level] : Distress Level: 1 [Referred Patient  to social work for follow-up] : Patient was referred to social work for follow-up [FreeTextEntry7] : Patient would like to speak with SW regarding transportation.

## 2020-11-02 NOTE — PHYSICAL EXAM
[General Appearance - Well Developed] : well developed [General Appearance - Alert] : alert [General Appearance - In No Acute Distress] : in no acute distress [Thin] : thin [Heart Rate And Rhythm] : heart rate and rhythm were normal [Heart Sounds] : normal S1 and S2 [Bowel Sounds] : normal bowel sounds [Abdomen Soft] : soft [Nondistended] : nondistended [Abdomen Tenderness] : non-tender [Motor Tone] : muscle strength and tone were normal [No Spine Tenderness] : no tenderness to palpation of the vertebral spine [Normal] : oriented to person, place and time, the affect was normal, the mood was normal and not anxious [Sclera] : the sclera and conjunctiva were normal [Outer Ear] : the ears and nose were normal in appearance [Hearing Threshold Finger Rub Not [place]] : hearing was normal [de-identified] : +systolic murmur. [de-identified] : No tenderness upon palpation at this time.

## 2020-11-03 ENCOUNTER — NON-APPOINTMENT (OUTPATIENT)
Age: 55
End: 2020-11-03

## 2020-11-03 VITALS
TEMPERATURE: 98.3 F | DIASTOLIC BLOOD PRESSURE: 78 MMHG | WEIGHT: 87 LBS | SYSTOLIC BLOOD PRESSURE: 113 MMHG | BODY MASS INDEX: 16.99 KG/M2 | HEART RATE: 97 BPM | RESPIRATION RATE: 15 BRPM | OXYGEN SATURATION: 96 %

## 2020-11-09 ENCOUNTER — NON-APPOINTMENT (OUTPATIENT)
Age: 55
End: 2020-11-09

## 2020-11-09 NOTE — HISTORY OF PRESENT ILLNESS
[FreeTextEntry1] : Ms. Halima Zapien is a 55 year old woman with a diagnosis of metastatic lung cancer to multiple bone metastases, in particular two painful regions in the right upper back just medial to scapula (in the area of right pleural metastasis and spinal mets) and the lower lumbar spine, including the area of the right SI joint. \par \par 11/3/20 - OTV - Ms. Zapien has completed 2000 cGy / 2000 cGy to T2-T8, rib and 1600 cGy / 2000 cGy to the lumbar spine.  Today, she reports severe lower back pain rated 10/10.  She has been taking percocet 2 tabs q 6 h with minimal relief.  Her insurance no longer covers oxycontin, so she has had no long acting pain medication since last week.  Dr. Ruffin prescribed MS britton, but her pharmacy does not have it ready yet.  She denies numbness, tingling, weakness, skin irritation.  She does note odynophagia, relieved with MMW.  She is taking pantoprazole.  She is using Aquaphor. \par \par 10/27/20 - OTV - Ms. Zapien has completed 1600 cGy / 2000 cGy to T2-T8, rib and 0 cGy / 2000 cGy to the lumbar spine.  Today, she reports 10/10 pain for the past few days.  She states she ran out of Oxycontin and had trouble obtaining it from the pharmacy, which she feels contributed to worsening pain.  She has been taking Percocet q4h PRN and dexamethasone.  She is taking bowel regimen Rx'd by Dr. Ruffin.  She denies numbness, weakness, paresthesias.  She is using shea butter for skin care.  \par ________________________________\par ONCOLOGIC HISTORY \par Ms. Halima Zapien is a 55 year-old female former smoker referred by Dr. 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  She presented to her PCP, Dr. Joel Townsend, in August 2020 with a palpable nodule in her left neck with unintentional weight loss.  She also noted right subscapular pain in February 2020, and it resolved, then returned in June 2020. She was referred to Dr. Ruffin and underwent CT neck, which reportedly showed possible PE and concern for metastasis.  She was referred to the ED on 8/20/20 and underwent further workup as follows:\par \par 8/20/2020, CTA CAP \par -Left lung mass consistent with malignancy suspicious for primary which broadly contacts left major fissure and also pericardium. \par -Left supraclavicular and right hilar adenopathy consistent with metastasis. \par - Diffuse osseous metastases throughout the axial skeleton, sternum, and pelvis with multilevel compression deformities suspected pathologic bases as detailed above. \par -Suspected hepatic and right pleural metastases. \par -Punctate noncalcified subpleural nodule medial right middle lobe 0.2 cm, indeterminate, possibly metastasis. \par \par 8/21/2020, PET/CT \par -FDG avid left lung mass with a right hilar lymph node metastasis, at least five liver metastases, and diffuse skeletal osseous metastases. \par -There is a pleural metastasis overlying an abnormal right posterior 7th rib. \par -There are also probable metastases to the anterior mediastinum and supraclavicular lymph nodes. \par \par 8/21/2020, Left lobe liver mass, core biopsy:\par -Metastatic adenocarcinoma, consistent with lung origin.\par \par 8/22/2020, MRI brain \par -Osseous metastatic lesions involving the left frontal bone of the calvarium extending to the left orbital roof and additional lesion in the right posterior temporal-occipital calvarium. CT had could be obtained to assess for cortical disruption if indicated clinically. \par -No brain parenchymal mass identified. However, contrast-enhanced MR imaging is more sensitive for the detection of parenchymal metastasis and is recommended when the patient is able. \par -Chronic microangiopathic disease. No acute infarct. \par \par She started systemic therapy on 9/15/2020 with Alimta/Pembro/Carboplatin under the care of Dr. Ruffin.  She developed mid to low back pain.  \par \par 9/24/2020, MRI thoracic spine ([facility])-\par -Extensive metastatic disease throughout the thoracic spine, pelvis and femurs with extension of metastatic disease into the spinal canal as well as multiple neural foramen. Abnormal hyperintense T2 signal within the spinal cord at the level of T8 is consistent with edema versus myelomalacia.\par -Left lung mass on  images. Thin, heterogeneous abnormal pleural signal at multiple thoracic levels \par \par 9/24/2020, MRI lumbar spine ([facility])-\par -Extensive metastatic disease throughout the lumbar spine with mild compression deformities at multiple lumbar levels and retropulsion of osseous material/ metastatic disease into the anterior spinal canal at all lumbar levels. Note is made of moderate-severe central canal stenosis and there is encroachment on the left L5 nerve root in the left lateral recess as well as the exiting left L5 nerve root.\par -Nonspecific distention of the gallbladder; a dedicated right upper quadrant ultrasound could be obtained for further evaluation as clinically warranted.\par \par She presented to the ED on 9/29/20 with worsening back pain and concern for cord compression on MRI.  She was seen by Dr. D'Amico of neurosurgery who recommended CT of the thoracic spine and radiation consult.  She declined to have CT done in favor of outpatient follow-up and was discharged.  She last saw Dr. Ruffin last week; plan is to start Erlotinib. She had been scheduled for second cycle of chemotherapy on 10/6/2020, but CARIS testing confirmed EGFR positivity. She is scheduled for follow-up with Dr. D'Amico on 10/14/20.  \par \par Today, she reports back pain, right subscapular, currently rated 5/ 10, but can reach 10/10. Pain is aching in quality. She also has lower back (across lower back), but states she has history of scoliosis and has suffered from chronic lower back pain. She takes Oxycodone 10 mg twice daily, as well as Percocet (5/325) every 6 hours. She states the pain meds are somewhat helpful for pain. She states activity and moving around make the pain worse and thus her normal activity is limited. She also reports opioid- induced constipation, which resolved after using laxative. She further notes weight loss of 11 pounds since July 2020. She denies further complaints, to include CP, SOB, fever, chills, weakness, bowel/ bladder dysfunction, dizziness, numbness, tingling. \par \par LMP- 5 years ago. \par \par [place] [facility]. She lives alone, but her daughter has been staying with her since she was diagnosed. 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 She gets to medical appointments via Uber/ taxi. She is a former smoker, smoked 1/4 ppd x 40 years, quit this year. Denies alcohol or illicit drug use. \par \par Family history is notable for father with throat cancer and lung cancer (smoker), uncle and brother with leukemia.

## 2020-11-09 NOTE — REVIEW OF SYSTEMS
[Negative] : Allergic/Immunologic [Esophagitis: Grade 2 - Symptomatic; altered eating/swallowing;  oral supplements indicated] : Esophagitis: Grade 2 - Symptomatic; altered eating/swallowing;  oral supplements indicated [FreeTextEntry9] : +back pain.

## 2020-11-09 NOTE — DISEASE MANAGEMENT
[Clinical] : TNM Stage: c [IV] : IV [TTNM] : - [NTNM] : - [MTNM] : 1 [de-identified] : 2000 cGy [de-identified] : 2000 cGy [de-identified] : T2-T8 rib, 1600 cGy / 2000 cGy lumbar spine

## 2020-11-09 NOTE — PHYSICAL EXAM
[General Appearance - Well Developed] : well developed [General Appearance - Alert] : alert [General Appearance - In No Acute Distress] : in no acute distress [Thin] : thin [Sclera] : the sclera and conjunctiva were normal [Outer Ear] : the ears and nose were normal in appearance [Hearing Threshold Finger Rub Not [place]] : hearing was normal [Heart Rate And Rhythm] : heart rate and rhythm were normal [Heart Sounds] : normal S1 and S2 [Bowel Sounds] : normal bowel sounds [Abdomen Soft] : soft [Nondistended] : nondistended [Abdomen Tenderness] : non-tender [Motor Tone] : muscle strength and tone were normal [No Spine Tenderness] : no tenderness to palpation of the vertebral spine [Normal] : oriented to person, place and time, the affect was normal, the mood was normal and not anxious [de-identified] : +systolic murmur. [de-identified] : No tenderness upon palpation at this time.

## 2020-11-17 ENCOUNTER — EMERGENCY (EMERGENCY)
Facility: HOSPITAL | Age: 55
LOS: 1 days | Discharge: HOME | End: 2020-11-17
Attending: EMERGENCY MEDICINE | Admitting: EMERGENCY MEDICINE
Payer: COMMERCIAL

## 2020-11-17 VITALS
OXYGEN SATURATION: 96 % | HEIGHT: 60 IN | WEIGHT: 87.08 LBS | TEMPERATURE: 100 F | SYSTOLIC BLOOD PRESSURE: 134 MMHG | HEART RATE: 118 BPM | RESPIRATION RATE: 16 BRPM | DIASTOLIC BLOOD PRESSURE: 84 MMHG

## 2020-11-17 DIAGNOSIS — R06.02 SHORTNESS OF BREATH: ICD-10-CM

## 2020-11-17 DIAGNOSIS — Z90.89 ACQUIRED ABSENCE OF OTHER ORGANS: Chronic | ICD-10-CM

## 2020-11-17 DIAGNOSIS — R00.2 PALPITATIONS: ICD-10-CM

## 2020-11-17 PROCEDURE — 99285 EMERGENCY DEPT VISIT HI MDM: CPT

## 2020-11-17 PROCEDURE — 93010 ELECTROCARDIOGRAM REPORT: CPT

## 2020-11-17 NOTE — ED ADULT TRIAGE NOTE - CHIEF COMPLAINT QUOTE
hx of lung ca, started po chemo 1 week ago. today began feeling sob, heart racing. denies cough/ fever. respirations even unlabored.

## 2020-11-18 VITALS
SYSTOLIC BLOOD PRESSURE: 105 MMHG | TEMPERATURE: 100 F | DIASTOLIC BLOOD PRESSURE: 70 MMHG | OXYGEN SATURATION: 94 % | HEART RATE: 97 BPM | RESPIRATION RATE: 18 BRPM

## 2020-11-18 LAB
ALBUMIN SERPL ELPH-MCNC: 3.1 G/DL — LOW (ref 3.3–5)
ALP SERPL-CCNC: 502 U/L — HIGH (ref 40–120)
ALT FLD-CCNC: 11 U/L — SIGNIFICANT CHANGE UP (ref 10–45)
ANION GAP SERPL CALC-SCNC: 11 MMOL/L — SIGNIFICANT CHANGE UP (ref 5–17)
AST SERPL-CCNC: 18 U/L — SIGNIFICANT CHANGE UP (ref 10–40)
BASOPHILS # BLD AUTO: 0.04 K/UL — SIGNIFICANT CHANGE UP (ref 0–0.2)
BASOPHILS NFR BLD AUTO: 0.8 % — SIGNIFICANT CHANGE UP (ref 0–2)
BILIRUB SERPL-MCNC: 0.4 MG/DL — SIGNIFICANT CHANGE UP (ref 0.2–1.2)
BUN SERPL-MCNC: 7 MG/DL — SIGNIFICANT CHANGE UP (ref 7–23)
CALCIUM SERPL-MCNC: 9.3 MG/DL — SIGNIFICANT CHANGE UP (ref 8.4–10.5)
CHLORIDE SERPL-SCNC: 97 MMOL/L — SIGNIFICANT CHANGE UP (ref 96–108)
CO2 SERPL-SCNC: 27 MMOL/L — SIGNIFICANT CHANGE UP (ref 22–31)
CREAT SERPL-MCNC: 0.46 MG/DL — LOW (ref 0.5–1.3)
EOSINOPHIL # BLD AUTO: 0.36 K/UL — SIGNIFICANT CHANGE UP (ref 0–0.5)
EOSINOPHIL NFR BLD AUTO: 7.2 % — HIGH (ref 0–6)
GLUCOSE SERPL-MCNC: 127 MG/DL — HIGH (ref 70–99)
HCT VFR BLD CALC: 31 % — LOW (ref 34.5–45)
HGB BLD-MCNC: 9.1 G/DL — LOW (ref 11.5–15.5)
IMM GRANULOCYTES NFR BLD AUTO: 1.8 % — HIGH (ref 0–1.5)
LYMPHOCYTES # BLD AUTO: 0.55 K/UL — LOW (ref 1–3.3)
LYMPHOCYTES # BLD AUTO: 11 % — LOW (ref 13–44)
MCHC RBC-ENTMCNC: 25.7 PG — LOW (ref 27–34)
MCHC RBC-ENTMCNC: 29.4 GM/DL — LOW (ref 32–36)
MCV RBC AUTO: 87.6 FL — SIGNIFICANT CHANGE UP (ref 80–100)
MONOCYTES # BLD AUTO: 0.59 K/UL — SIGNIFICANT CHANGE UP (ref 0–0.9)
MONOCYTES NFR BLD AUTO: 11.8 % — SIGNIFICANT CHANGE UP (ref 2–14)
NEUTROPHILS # BLD AUTO: 3.37 K/UL — SIGNIFICANT CHANGE UP (ref 1.8–7.4)
NEUTROPHILS NFR BLD AUTO: 67.4 % — SIGNIFICANT CHANGE UP (ref 43–77)
NRBC # BLD: 0 /100 WBCS — SIGNIFICANT CHANGE UP (ref 0–0)
PLATELET # BLD AUTO: 380 K/UL — SIGNIFICANT CHANGE UP (ref 150–400)
POTASSIUM SERPL-MCNC: 4 MMOL/L — SIGNIFICANT CHANGE UP (ref 3.5–5.3)
POTASSIUM SERPL-SCNC: 4 MMOL/L — SIGNIFICANT CHANGE UP (ref 3.5–5.3)
PROT SERPL-MCNC: 6.1 G/DL — SIGNIFICANT CHANGE UP (ref 6–8.3)
RBC # BLD: 3.54 M/UL — LOW (ref 3.8–5.2)
RBC # FLD: 17.3 % — HIGH (ref 10.3–14.5)
SODIUM SERPL-SCNC: 135 MMOL/L — SIGNIFICANT CHANGE UP (ref 135–145)
TROPONIN T SERPL-MCNC: <0.01 NG/ML — SIGNIFICANT CHANGE UP (ref 0–0.01)
WBC # BLD: 5 K/UL — SIGNIFICANT CHANGE UP (ref 3.8–10.5)
WBC # FLD AUTO: 5 K/UL — SIGNIFICANT CHANGE UP (ref 3.8–10.5)

## 2020-11-18 PROCEDURE — 71275 CT ANGIOGRAPHY CHEST: CPT

## 2020-11-18 PROCEDURE — 71275 CT ANGIOGRAPHY CHEST: CPT | Mod: 26

## 2020-11-18 PROCEDURE — 83880 ASSAY OF NATRIURETIC PEPTIDE: CPT

## 2020-11-18 PROCEDURE — 80053 COMPREHEN METABOLIC PANEL: CPT

## 2020-11-18 PROCEDURE — 36415 COLL VENOUS BLD VENIPUNCTURE: CPT

## 2020-11-18 PROCEDURE — 85379 FIBRIN DEGRADATION QUANT: CPT

## 2020-11-18 PROCEDURE — 85610 PROTHROMBIN TIME: CPT

## 2020-11-18 PROCEDURE — 85730 THROMBOPLASTIN TIME PARTIAL: CPT

## 2020-11-18 PROCEDURE — 99284 EMERGENCY DEPT VISIT MOD MDM: CPT

## 2020-11-18 PROCEDURE — 93005 ELECTROCARDIOGRAM TRACING: CPT

## 2020-11-18 PROCEDURE — 85025 COMPLETE CBC W/AUTO DIFF WBC: CPT

## 2020-11-18 PROCEDURE — 84484 ASSAY OF TROPONIN QUANT: CPT

## 2020-11-18 NOTE — ED ADULT NURSE NOTE - OBJECTIVE STATEMENT
pt to ED for sob, hx of lung ca, started po CTx 1wk ago, today worsening sob, heart racing. pt denies cough/fever/chills, dysuria, n/v/d, dizziness, numbness, tingling.

## 2020-11-18 NOTE — ED PROVIDER NOTE - NSFOLLOWUPINSTRUCTIONS_ED_ALL_ED_FT
PLEASE REST AND REMAIN HYDRATED. KEEP SCHEDULED FOLLOW-UP APPOINTMENTS WITH ONCOLOGIST.    PLEASE RETURN TO THE EMERGENCY DEPARTMENT IF FEVER, SEVERE HEADACHE, CHEST PAIN, SHORTNESS OF BREATH, ABDOMINAL PAIN, VOMITING, OTHER CONCERNING SYMPTOMS.

## 2020-11-18 NOTE — ED PROVIDER NOTE - PHYSICAL EXAMINATION
CONST: nontoxic NAD speaking in full sentences  HEAD: atraumatic  EYES: conjunctivae clear, PERRL, EOMI  ENT: mmm  NECK: supple/FROM, nttp, no jvd  CARD: rrr no murmurs  CHEST: ctab no r/r/w, no stridor/retractions/tripoding  ABD: soft, nd, nttp, no rebound/guarding  EXT: FROM, symmetric distal pulses intact  SKIN: warm, dry, no rash, no pedal edema, cap refill <2sec  NEURO: a+ox3, 5/5 strength x4, gross sensation intact x4, normal gait

## 2020-11-18 NOTE — ED PROVIDER NOTE - TEMPLATE, MLM
Problem: Non-Pressure Injury Wound  Intervention: # Assess and measure wound every 7 days and if status is deteriorating.  Enter today's date indicating that the wound was measured.  This will produce a worklist task that will fire 7 days from the date entered to repeat the measurement.    Patient presents to wound care for follow-up of left hip.  Patient gives permission to discuss personal health information with Yanna DORAN () in room.  Patient asked RN to look at left ankle.  Patient able to tell RN how she removes the stocking, applies moisturizer, and reapplies tetragrip stocking.  Patient states she is not having difficulty with this.  Patient is healed at this point.  Left hip was assessed and healed.  Patient is given AVS and will return as needed.          General

## 2020-11-18 NOTE — ED PROVIDER NOTE - SHIFT CHANGE DETAILS
55F metastatic lung ca s/p xrt currently on chemotx c/o 2-3h sob/palpitations now resolved. labs/trop#1 wnl. ekg w/o acute abnl. cta w/o acute abnl.     dispo: pending trop#2, anticipate dc home w/ outpatient onc fu

## 2020-11-18 NOTE — ED PROVIDER NOTE - CLINICAL SUMMARY MEDICAL DECISION MAKING FREE TEXT BOX
avss. nontoxic. NAD. no systemic sx. no active cp. no acute resp distress. no leukocytosis vs significant anemia vs electrolyte abnl. trop#1 neg, pending repeat. ekg w/o acute st/t changes. cta chest w/o acute abnl. s/o'd overnight team stable pending trop#2. anticipate dc home w/ outpatient onc fu if trop#2 is neg.

## 2020-11-18 NOTE — ED PROVIDER NOTE - OBJECTIVE STATEMENT
55F former smoker (16py), recently dx w/ metastatic lung ca to liver/bone x/p spine xrt (completed 11/4/20) started on chemotx po (last week), c/o insidious sob/palpitations starting around 10p last night, since improved upon arrival to ED. +loose brown stool x1 episode today. no fever/chills, no ha/dizziness, no neck pain/stiffness, no uri/cough, no cp/sob, no abd pain/n/v, no hematochezia/melena, no dysuria, no rash, no prior covid, no trauma, no etoh/ivdu, no phx acs/mi, no phx dvt/pe, no antiplatelet/AC.    onc: jimmie 55F former smoker (16py), recently dx w/ metastatic lung ca to liver/bone x/p spine xrt (completed 11/4/20) started on chemotx po (last week), c/o insidious sob/palpitations starting around 10p last night, since improved upon arrival to ED. +loose brown stool x1 episode today. no fever/chills, no ha/dizziness, no neck pain/stiffness, no uri/cough, no cp/sob, no abd pain/n/v, no hematochezia/melena, no dysuria, no rash, no prior covid, no trauma, no etoh/ivdu, no phx acs/mi, no phx dvt/pe, no antiplatelet/AC.    onc: faturi

## 2020-11-18 NOTE — ED PROVIDER NOTE - PATIENT PORTAL LINK FT
You can access the FollowMyHealth Patient Portal offered by [facility] by registering at the following website: [email or web address]. By joining [employer]’s FollowMyHealth portal, you will also be able to view your health information using other applications (apps) compatible with our system.

## 2020-11-18 NOTE — ED ADULT NURSE NOTE - CHPI ED NUR SYMPTOMS NEG
no chills/no cough/no fever/no headache/no hemoptysis/no chest pain/no body aches/no wheezing/no diaphoresis/no edema

## 2020-11-23 ENCOUNTER — RX RENEWAL (OUTPATIENT)
Age: 55
End: 2020-11-23

## 2020-11-30 ENCOUNTER — EMERGENCY (EMERGENCY)
Facility: HOSPITAL | Age: 55
LOS: 1 days | Discharge: HOME | End: 2020-11-30
Attending: EMERGENCY MEDICINE | Admitting: EMERGENCY MEDICINE
Payer: COMMERCIAL

## 2020-11-30 VITALS
SYSTOLIC BLOOD PRESSURE: 127 MMHG | DIASTOLIC BLOOD PRESSURE: 85 MMHG | RESPIRATION RATE: 16 BRPM | HEART RATE: 110 BPM | HEIGHT: 60 IN | TEMPERATURE: 100 F | OXYGEN SATURATION: 95 % | WEIGHT: 82.01 LBS

## 2020-11-30 DIAGNOSIS — R00.2 PALPITATIONS: ICD-10-CM

## 2020-11-30 DIAGNOSIS — Z90.89 ACQUIRED ABSENCE OF OTHER ORGANS: Chronic | ICD-10-CM

## 2020-11-30 DIAGNOSIS — Z20.828 CONTACT WITH AND (SUSPECTED) EXPOSURE TO OTHER VIRAL COMMUNICABLE DISEASES: ICD-10-CM

## 2020-11-30 LAB
ALBUMIN SERPL ELPH-MCNC: 3.3 G/DL — SIGNIFICANT CHANGE UP (ref 3.3–5)
ALP SERPL-CCNC: 570 U/L — HIGH (ref 40–120)
ALT FLD-CCNC: 8 U/L — LOW (ref 10–45)
ANION GAP SERPL CALC-SCNC: 11 MMOL/L — SIGNIFICANT CHANGE UP (ref 5–17)
APTT BLD: 34.7 SEC — SIGNIFICANT CHANGE UP (ref 27.5–35.5)
AST SERPL-CCNC: 16 U/L — SIGNIFICANT CHANGE UP (ref 10–40)
BASOPHILS # BLD AUTO: 0.06 K/UL — SIGNIFICANT CHANGE UP (ref 0–0.2)
BASOPHILS NFR BLD AUTO: 1 % — SIGNIFICANT CHANGE UP (ref 0–2)
BILIRUB SERPL-MCNC: 0.2 MG/DL — SIGNIFICANT CHANGE UP (ref 0.2–1.2)
BUN SERPL-MCNC: 9 MG/DL — SIGNIFICANT CHANGE UP (ref 7–23)
CALCIUM SERPL-MCNC: 9.4 MG/DL — SIGNIFICANT CHANGE UP (ref 8.4–10.5)
CHLORIDE SERPL-SCNC: 95 MMOL/L — LOW (ref 96–108)
CK MB CFR SERPL CALC: <1 NG/ML — SIGNIFICANT CHANGE UP (ref 0–6.7)
CK SERPL-CCNC: 51 U/L — SIGNIFICANT CHANGE UP (ref 25–170)
CO2 SERPL-SCNC: 28 MMOL/L — SIGNIFICANT CHANGE UP (ref 22–31)
CREAT SERPL-MCNC: 0.44 MG/DL — LOW (ref 0.5–1.3)
D DIMER BLD IA.RAPID-MCNC: 286 NG/ML DDU — HIGH
EOSINOPHIL # BLD AUTO: 0.38 K/UL — SIGNIFICANT CHANGE UP (ref 0–0.5)
EOSINOPHIL NFR BLD AUTO: 6.3 % — HIGH (ref 0–6)
GLUCOSE SERPL-MCNC: 110 MG/DL — HIGH (ref 70–99)
HCT VFR BLD CALC: 32.8 % — LOW (ref 34.5–45)
HGB BLD-MCNC: 9.6 G/DL — LOW (ref 11.5–15.5)
IMM GRANULOCYTES NFR BLD AUTO: 2 % — HIGH (ref 0–1.5)
INR BLD: 1.03 — SIGNIFICANT CHANGE UP (ref 0.88–1.16)
LYMPHOCYTES # BLD AUTO: 0.73 K/UL — LOW (ref 1–3.3)
LYMPHOCYTES # BLD AUTO: 12.1 % — LOW (ref 13–44)
MAGNESIUM SERPL-MCNC: 2 MG/DL — SIGNIFICANT CHANGE UP (ref 1.6–2.6)
MCHC RBC-ENTMCNC: 26.1 PG — LOW (ref 27–34)
MCHC RBC-ENTMCNC: 29.3 GM/DL — LOW (ref 32–36)
MCV RBC AUTO: 89.1 FL — SIGNIFICANT CHANGE UP (ref 80–100)
MONOCYTES # BLD AUTO: 0.89 K/UL — SIGNIFICANT CHANGE UP (ref 0–0.9)
MONOCYTES NFR BLD AUTO: 14.7 % — HIGH (ref 2–14)
NEUTROPHILS # BLD AUTO: 3.87 K/UL — SIGNIFICANT CHANGE UP (ref 1.8–7.4)
NEUTROPHILS NFR BLD AUTO: 63.9 % — SIGNIFICANT CHANGE UP (ref 43–77)
NRBC # BLD: 0 /100 WBCS — SIGNIFICANT CHANGE UP (ref 0–0)
PLATELET # BLD AUTO: 343 K/UL — SIGNIFICANT CHANGE UP (ref 150–400)
POTASSIUM SERPL-MCNC: 4.3 MMOL/L — SIGNIFICANT CHANGE UP (ref 3.5–5.3)
POTASSIUM SERPL-SCNC: 4.3 MMOL/L — SIGNIFICANT CHANGE UP (ref 3.5–5.3)
PROT SERPL-MCNC: 6.4 G/DL — SIGNIFICANT CHANGE UP (ref 6–8.3)
PROTHROM AB SERPL-ACNC: 12.3 SEC — SIGNIFICANT CHANGE UP (ref 10.6–13.6)
RBC # BLD: 3.68 M/UL — LOW (ref 3.8–5.2)
RBC # FLD: 18.6 % — HIGH (ref 10.3–14.5)
SARS-COV-2 RNA SPEC QL NAA+PROBE: SIGNIFICANT CHANGE UP
SODIUM SERPL-SCNC: 134 MMOL/L — LOW (ref 135–145)
TROPONIN T SERPL-MCNC: <0.01 NG/ML — SIGNIFICANT CHANGE UP (ref 0–0.01)
TROPONIN T SERPL-MCNC: <0.01 NG/ML — SIGNIFICANT CHANGE UP (ref 0–0.01)
WBC # BLD: 6.05 K/UL — SIGNIFICANT CHANGE UP (ref 3.8–10.5)
WBC # FLD AUTO: 6.05 K/UL — SIGNIFICANT CHANGE UP (ref 3.8–10.5)

## 2020-11-30 PROCEDURE — 93010 ELECTROCARDIOGRAM REPORT: CPT | Mod: 77

## 2020-11-30 PROCEDURE — 82550 ASSAY OF CK (CPK): CPT

## 2020-11-30 PROCEDURE — 93010 ELECTROCARDIOGRAM REPORT: CPT

## 2020-11-30 PROCEDURE — 71275 CT ANGIOGRAPHY CHEST: CPT

## 2020-11-30 PROCEDURE — 71275 CT ANGIOGRAPHY CHEST: CPT | Mod: 26

## 2020-11-30 PROCEDURE — 85379 FIBRIN DEGRADATION QUANT: CPT

## 2020-11-30 PROCEDURE — 96361 HYDRATE IV INFUSION ADD-ON: CPT

## 2020-11-30 PROCEDURE — U0003: CPT

## 2020-11-30 PROCEDURE — 99285 EMERGENCY DEPT VISIT HI MDM: CPT

## 2020-11-30 PROCEDURE — 85025 COMPLETE CBC W/AUTO DIFF WBC: CPT

## 2020-11-30 PROCEDURE — 99284 EMERGENCY DEPT VISIT MOD MDM: CPT | Mod: 25

## 2020-11-30 PROCEDURE — 80053 COMPREHEN METABOLIC PANEL: CPT

## 2020-11-30 PROCEDURE — 36415 COLL VENOUS BLD VENIPUNCTURE: CPT

## 2020-11-30 PROCEDURE — 83735 ASSAY OF MAGNESIUM: CPT

## 2020-11-30 PROCEDURE — 93005 ELECTROCARDIOGRAM TRACING: CPT

## 2020-11-30 PROCEDURE — 96374 THER/PROPH/DIAG INJ IV PUSH: CPT | Mod: XU

## 2020-11-30 PROCEDURE — 85730 THROMBOPLASTIN TIME PARTIAL: CPT

## 2020-11-30 PROCEDURE — 84484 ASSAY OF TROPONIN QUANT: CPT

## 2020-11-30 PROCEDURE — 85610 PROTHROMBIN TIME: CPT

## 2020-11-30 PROCEDURE — 82553 CREATINE MB FRACTION: CPT

## 2020-11-30 RX ORDER — MORPHINE SULFATE 50 MG/1
4 CAPSULE, EXTENDED RELEASE ORAL ONCE
Refills: 0 | Status: DISCONTINUED | OUTPATIENT
Start: 2020-11-30 | End: 2020-11-30

## 2020-11-30 RX ORDER — SODIUM CHLORIDE 9 MG/ML
1000 INJECTION INTRAMUSCULAR; INTRAVENOUS; SUBCUTANEOUS ONCE
Refills: 0 | Status: COMPLETED | OUTPATIENT
Start: 2020-11-30 | End: 2020-11-30

## 2020-11-30 RX ADMIN — SODIUM CHLORIDE 1000 MILLILITER(S): 9 INJECTION INTRAMUSCULAR; INTRAVENOUS; SUBCUTANEOUS at 20:19

## 2020-11-30 RX ADMIN — SODIUM CHLORIDE 1000 MILLILITER(S): 9 INJECTION INTRAMUSCULAR; INTRAVENOUS; SUBCUTANEOUS at 23:20

## 2020-11-30 RX ADMIN — MORPHINE SULFATE 4 MILLIGRAM(S): 50 CAPSULE, EXTENDED RELEASE ORAL at 23:25

## 2020-11-30 NOTE — ED PROVIDER NOTE - CARE PROVIDER_API CALL
Rickie Matthew  CARDIAC ELECTROPHYSIOLOGY  [address] [address]  Phone: [phone]  Fax: [phone]  Follow Up Time: Urgent    Denver Kunz (MD)  Cardiovascular Disease; Internal Medicine  Cardiology [facility], [address] [place] [id number]  Phone: [phone]  Fax: [phone]  Follow Up Time: Urgent

## 2020-11-30 NOTE — ED ADULT NURSE NOTE - OBJECTIVE STATEMENT
Pt. presents to the ED c/o heart palpitations that began at approx. 6p.m. today after taking prescribed 5mg. of Methadone she takes for pain control. Pt. has hx / is currently being treated for lung CA. Pt. used to take Percocet and morphine for pain control r/t her radiation treatments, but has now been on methadone as per her oncologist's recommendation. Pt. began on Methadone 10mg. approx. 3 weeks ago, had swelling of her upper and lower lip on the rt. side only, and stopped taking it immediately. Her oncologist adjusted the dosage to 5 mg and the pt. has been taking that dosage for the past 2 weeks without any complications. Pt. began taking PO Chemo approx. 2 weeks as well, and experienced heart palpitations after taking it, that the pt. reports felt exactly the same as what she is feeling today. Pt. stopped taking the PO chemo after experiencing the heart palpitations, consulted with her oncologist, and was supposed to restart the PO chemo again today, but she took the Methadone 5mg. today at 1700, felt the heart palpitations at 1800, and came into [facility] before she took the chemo. Pt. has no prior hx of any allergies or adverse reactions. Pt diet has remained unchanged. Pt denies CP, SOB, fever,. chills, N/V/D. Pt. denies any hx of anxiety.

## 2020-11-30 NOTE — ED PROVIDER NOTE - PHYSICAL EXAMINATION
CONST: nontoxic NAD speaking in full sentences  HEAD: atraumatic  EYES: conjunctivae clear, PERRL, EOMI  ENT: mmm  NECK: supple/FROM, nttp, no jvd  CARD: tachy regular no murmurs  CHEST: ctab no r/r/w, no stridor/retractions/tripoding  ABD: soft, nd, nttp, no rebound/guarding  EXT: FROM, symmetric distal pulses intact  SKIN: warm, dry, no rash, no pedal edema/pain/rash, cap refill <2sec  NEURO: a+ox3, 5/5 strength x4, gross sensation intact x4, normal gait

## 2020-11-30 NOTE — ED PROVIDER NOTE - CLINICAL SUMMARY MEDICAL DECISION MAKING FREE TEXT BOX
avss. nontoxic. NAD. no systemic sx. no active cp. no acute resp distress. no leukocytosis vs significant anemia vs electrolyte abnl. trop neg x2. ekg x2 w/o acute abnl. cta chest w/o acute abnl. no indication for inpatient hospitalization at this time. will dc w/ outpatient cards/onc fu, strict return precautions. pt/family agrees w/ plan. questions answered.

## 2020-11-30 NOTE — ED ADULT NURSE NOTE - CHPI ED NUR SYMPTOMS NEG
PT eval only
no chest pain/no chills/no back pain/no shortness of breath/no syncope/no congestion/no dizziness/no vomiting/no diaphoresis/no fever/no nausea

## 2020-11-30 NOTE — ED PROVIDER NOTE - OBJECTIVE STATEMENT
55F daily smoker (38py), migraines, sciatica, recently dx w/ metastatic likely primary lung adenocarcinoma s/p spine xrt (completed 11/4/20) currently on chemotx, recently seen for similar sx w/ unremarkable joseph including trop/cta chest (11/18/20), now returned for recurrent sustained palpitations x1h starting around 5p ~10min after taking methadone 5, palpitations since resolved w/o recurrence. pt was switched from morphine to methadone5 1.5w ago and has tolerated well. seen by onc earlier today for routine appt and was otherwise cleared to resume chemotx PO today since last ED visit but has not yet taken pill today. no fever/chills, no ha/dizziness, no uri/cough, no cp/sob, no abd pain/n/v, no diarrhea, no hematochezia/melena, no dysuria, no pedal edema/pain/rash, no prior covid, no trauma, no etoh/ivdu.    onc: lucio

## 2020-11-30 NOTE — ED ADULT NURSE NOTE - CCCP TRG CHIEF CMPLNT
palpitations no arthralgia/no muscle cramps/no muscle weakness/no neck pain/no back pain/no leg pain R/no stiffness/no arm pain R/no joint swelling/no myalgia no arthralgia/no joint swelling/no myalgia/no muscle cramps/no muscle weakness/no neck pain/no back pain/no leg pain R/no stiffness

## 2020-11-30 NOTE — ED ADULT TRIAGE NOTE - CHIEF COMPLAINT QUOTE
Pt w/ hx of lung ca, on methadone for pain control, c/o palpitations that began 30 minutes after taking medication. Pt is prescribed 10 mg but only takes 5 mg. Denies CP, fever, chills, SOB, back pain.

## 2020-11-30 NOTE — ED PROVIDER NOTE - CARE PROVIDERS DIRECT ADDRESSES
[email or web address]

## 2020-11-30 NOTE — ED PROVIDER NOTE - PATIENT PORTAL LINK FT
You can access the FollowMyHealth Patient Portal offered by [facility] by registering at the following website: [email or web address]. By joining [facility]’s FollowMyHealth portal, you will also be able to view your health information using other applications (apps) compatible with our system.

## 2020-11-30 NOTE — ED PROVIDER NOTE - PROVIDER TOKENS
PROVIDER:[TOKEN:[[id number]],FOLLOWUP:[Urgent]],PROVIDER:[TOKEN:[[id number]],FOLLOWUP:[Urgent]]

## 2020-12-01 VITALS — HEART RATE: 81 BPM

## 2020-12-08 ENCOUNTER — APPOINTMENT (OUTPATIENT)
Dept: RADIATION ONCOLOGY | Facility: CLINIC | Age: 55
End: 2020-12-08

## 2020-12-08 ENCOUNTER — APPOINTMENT (OUTPATIENT)
Dept: RADIATION ONCOLOGY | Facility: CLINIC | Age: 55
End: 2020-12-08
Payer: COMMERCIAL

## 2020-12-08 PROCEDURE — 99024 POSTOP FOLLOW-UP VISIT: CPT

## 2020-12-08 RX ORDER — OXYCODONE HYDROCHLORIDE 10 MG/1
10 TABLET, FILM COATED, EXTENDED RELEASE ORAL
Qty: 30 | Refills: 0 | Status: DISCONTINUED | COMMUNITY
Start: 2020-10-05 | End: 2020-12-08

## 2020-12-08 RX ORDER — OXYCODONE AND ACETAMINOPHEN 5; 325 MG/1; MG/1
5-325 TABLET ORAL
Qty: 60 | Refills: 0 | Status: DISCONTINUED | COMMUNITY
Start: 2020-10-06 | End: 2020-12-08

## 2020-12-08 RX ORDER — FAMOTIDINE 20 MG/1
20 TABLET ORAL
Qty: 10 | Refills: 0 | Status: DISCONTINUED | COMMUNITY
Start: 2020-10-14 | End: 2020-12-08

## 2020-12-08 RX ORDER — LIDOCAINE HYDROCHLORIDE 20 MG/ML
2 SOLUTION ORAL; TOPICAL
Qty: 400 | Refills: 0 | Status: DISCONTINUED | COMMUNITY
Start: 2020-10-27 | End: 2020-12-08

## 2020-12-08 RX ORDER — PANTOPRAZOLE 40 MG/1
40 TABLET, DELAYED RELEASE ORAL
Qty: 30 | Refills: 0 | Status: DISCONTINUED | COMMUNITY
Start: 2020-10-27 | End: 2020-12-08

## 2020-12-08 RX ORDER — DEXAMETHASONE 4 MG/1
4 TABLET ORAL
Qty: 10 | Refills: 0 | Status: COMPLETED | COMMUNITY
Start: 2020-10-14 | End: 2020-12-08

## 2020-12-08 NOTE — PHYSICAL EXAM
Called patient to give urine culture results patient acknowledge she also stated that she did fill the rx for fluconazole patient states she feels much better I advised patient to call if symptoms persist    [Motor Tone] : muscle strength and tone were normal [No Spine Tenderness] : no tenderness to palpation of the vertebral spine [Normal] : well developed, well nourished, in no acute distress

## 2020-12-08 NOTE — DISEASE MANAGEMENT
[Clinical] : TNM Stage: c [IV] : IV [TTNM] : - [NTNM] : - [MTNM] : 1 [de-identified] : 2000 cGy [de-identified] : 2000 cGy [de-identified] : T2-T8 rib, 1600 cGy / 2000 cGy lumbar spine

## 2020-12-08 NOTE — REVIEW OF SYSTEMS
[Negative] : Neurological [Esophagitis: Grade 0] : Esophagitis: Grade 0 [FreeTextEntry9] : +back pain.

## 2020-12-11 ENCOUNTER — APPOINTMENT (OUTPATIENT)
Dept: HEART AND VASCULAR | Facility: CLINIC | Age: 55
End: 2020-12-11
Payer: COMMERCIAL

## 2020-12-11 VITALS
HEART RATE: 84 BPM | OXYGEN SATURATION: 97 % | BODY MASS INDEX: 16.56 KG/M2 | DIASTOLIC BLOOD PRESSURE: 70 MMHG | WEIGHT: 79.98 LBS | SYSTOLIC BLOOD PRESSURE: 100 MMHG | HEIGHT: 58.27 IN

## 2020-12-11 DIAGNOSIS — C79.51 SECONDARY MALIGNANT NEOPLASM OF BONE: ICD-10-CM

## 2020-12-11 DIAGNOSIS — R00.2 PALPITATIONS: ICD-10-CM

## 2020-12-11 DIAGNOSIS — C34.90 MALIGNANT NEOPLASM OF UNSPECIFIED PART OF UNSPECIFIED BRONCHUS OR LUNG: ICD-10-CM

## 2020-12-11 PROCEDURE — 99204 OFFICE O/P NEW MOD 45 MIN: CPT

## 2020-12-11 PROCEDURE — 99072 ADDL SUPL MATRL&STAF TM PHE: CPT

## 2020-12-11 PROCEDURE — 93000 ELECTROCARDIOGRAM COMPLETE: CPT

## 2020-12-11 NOTE — ASSESSMENT
[FreeTextEntry1] : 55 F \par \par Palpitations \par EKG: NSR, no ischemic changes\par Metastatic Lung Ca on chemo, s/p radiation\par CTA Chest Nov 2020: no PE, left lung mass (known)\par \par - ECHO \par - 48 hr holter \par - obtain outside labs

## 2020-12-11 NOTE — PHYSICAL EXAM
[Normal Appearance] : normal appearance [Well Groomed] : well groomed [No Deformities] : no deformities [General Appearance - In No Acute Distress] : no acute distress [Normal Conjunctiva] : the conjunctiva exhibited no abnormalities [Eyelids - No Xanthelasma] : the eyelids demonstrated no xanthelasmas [Normal Oral Mucosa] : normal oral mucosa [No Oral Pallor] : no oral pallor [No Oral Cyanosis] : no oral cyanosis [Normal Jugular Venous A Waves Present] : normal jugular venous A waves present [Normal Jugular Venous V Waves Present] : normal jugular venous V waves present [No Jugular Venous Beverly A Waves] : no jugular venous beverly A waves [Heart Rate And Rhythm] : heart rate and rhythm were normal [Heart Sounds] : normal S1 and S2 [Murmurs] : no murmurs present [Respiration, Rhythm And Depth] : normal respiratory rhythm and effort [Exaggerated Use Of Accessory Muscles For Inspiration] : no accessory muscle use [Auscultation Breath Sounds / Voice Sounds] : lungs were clear to auscultation bilaterally [Abdomen Soft] : soft [Abdomen Tenderness] : non-tender [Abdomen Mass (___ Cm)] : no abdominal mass palpated [Abnormal Walk] : normal gait [Gait - Sufficient For Exercise Testing] : the gait was sufficient for exercise testing [Nail Clubbing] : no clubbing of the fingernails [Cyanosis, Localized] : no localized cyanosis [Petechial Hemorrhages (___cm)] : no petechial hemorrhages [Skin Color & Pigmentation] : normal skin color and pigmentation [] : no rash [No Venous Stasis] : no venous stasis [Skin Lesions] : no skin lesions [No Skin Ulcers] : no skin ulcer [No Xanthoma] : no  xanthoma was observed [Oriented To Time, Place, And Person] : oriented to person, place, and time [Affect] : the affect was normal [Mood] : the mood was normal [No Anxiety] : not feeling anxious [FreeTextEntry1] : cachexia

## 2020-12-11 NOTE — HISTORY OF PRESENT ILLNESS
[FreeTextEntry1] : 55 F lung cancer Aug 2020, 9/11 survivor, smoker, on chemo/radiation\par \par 12/11/20:  two recent visits to [facility] ER for palpitations, recently started on erlotinib which she thinks was the cause.  On 11/17 sent home from ER, told everything was fine, CTA chest neg for PE.  Had CTA chest neg for PE again after repeat visit to ER 11/30.  Both episodes came on suddenly, lasted a few hours. Not associated chest pain or sob.  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  Remote history of stress testing and holter monitoring all normal per patient. \par \par EKG:  NSR, PRWP\par

## 2021-01-04 ENCOUNTER — APPOINTMENT (OUTPATIENT)
Dept: HEART AND VASCULAR | Facility: CLINIC | Age: 56
End: 2021-01-04

## 2021-01-05 ENCOUNTER — APPOINTMENT (OUTPATIENT)
Dept: HEART AND VASCULAR | Facility: CLINIC | Age: 56
End: 2021-01-05
Payer: COMMERCIAL

## 2021-01-05 PROCEDURE — 99072 ADDL SUPL MATRL&STAF TM PHE: CPT

## 2021-01-05 PROCEDURE — 93306 TTE W/DOPPLER COMPLETE: CPT

## 2022-05-20 NOTE — ED ADULT NURSE REASSESSMENT NOTE - NS ED NURSE REASSESS COMMENT FT1
Patient called requesting prescription for new compression stockings which was mailed to her address.     She also has questions regarding 5mm RLL nodule noted on 9/20/21. These results were relayed to her and her primary care provider at the time of the study. Patient states that her PCP was not sure what the next steps would be for this and has since taken a leave of absence.     Discussed Fleischner Society criteria with patient with the caveat that usually pulmonary nodules are followed/managed by primary care. Encouraged patient to establish with new PCP for further recommendations. She is in agreement with the plan.    Pt c/o increased pain, requesting pain medication.

## 2023-01-26 NOTE — ED ADULT NURSE NOTE - CADM POA PRESS ULCER
Julianne 89 called requesting a new mattress for hospital bed sent over and that it will be paid for due to it being over 5 years. No
